# Patient Record
Sex: MALE | Race: AMERICAN INDIAN OR ALASKA NATIVE
[De-identification: names, ages, dates, MRNs, and addresses within clinical notes are randomized per-mention and may not be internally consistent; named-entity substitution may affect disease eponyms.]

---

## 2019-06-25 ENCOUNTER — HOSPITAL ENCOUNTER (INPATIENT)
Dept: HOSPITAL 5 - ED | Age: 54
LOS: 4 days | Discharge: HOME | DRG: 292 | End: 2019-06-29
Attending: INTERNAL MEDICINE | Admitting: INTERNAL MEDICINE
Payer: MEDICARE

## 2019-06-25 DIAGNOSIS — S27.809A: ICD-10-CM

## 2019-06-25 DIAGNOSIS — I11.0: Primary | ICD-10-CM

## 2019-06-25 DIAGNOSIS — Z87.81: ICD-10-CM

## 2019-06-25 DIAGNOSIS — K44.9: ICD-10-CM

## 2019-06-25 DIAGNOSIS — I50.9: ICD-10-CM

## 2019-06-25 DIAGNOSIS — R07.81: ICD-10-CM

## 2019-06-25 DIAGNOSIS — E66.2: ICD-10-CM
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
## 2019-06-25 DIAGNOSIS — I42.8: ICD-10-CM

## 2019-06-25 DIAGNOSIS — T38.0X5A: ICD-10-CM

## 2019-06-25 DIAGNOSIS — M87.151: ICD-10-CM

## 2019-06-25 DIAGNOSIS — Z79.01: ICD-10-CM

## 2019-06-25 DIAGNOSIS — Z95.810: ICD-10-CM

## 2019-06-25 DIAGNOSIS — I48.0: ICD-10-CM

## 2019-06-25 DIAGNOSIS — F17.200: ICD-10-CM

## 2019-06-25 DIAGNOSIS — J44.1: ICD-10-CM

## 2019-06-25 DIAGNOSIS — Z82.49: ICD-10-CM

## 2019-06-25 DIAGNOSIS — Z83.3: ICD-10-CM

## 2019-06-25 DIAGNOSIS — R07.89: ICD-10-CM

## 2019-06-25 LAB
ALBUMIN SERPL-MCNC: 3.8 G/DL (ref 3.9–5)
ALT SERPL-CCNC: 20 UNITS/L (ref 7–56)
BAND NEUTROPHILS # (MANUAL): 0 K/MM3
BUN SERPL-MCNC: 11 MG/DL (ref 9–20)
BUN/CREAT SERPL: 9 %
CALCIUM SERPL-MCNC: 9.6 MG/DL (ref 8.4–10.2)
HCT VFR BLD CALC: 43.2 % (ref 35.5–45.6)
HEMOLYSIS INDEX: 25
HGB BLD-MCNC: 14.2 GM/DL (ref 11.8–15.2)
MCHC RBC AUTO-ENTMCNC: 33 % (ref 32–34)
MCV RBC AUTO: 84 FL (ref 84–94)
MYELOCYTES # (MANUAL): 0 K/MM3
PLATELET # BLD: 205 K/MM3 (ref 140–440)
PROMYELOCYTES # (MANUAL): 0 K/MM3
RBC # BLD AUTO: 5.16 M/MM3 (ref 3.65–5.03)
T4 FREE SERPL-MCNC: 1.63 NG/DL (ref 0.76–1.46)
TOTAL CELLS COUNTED BLD: 100

## 2019-06-25 PROCEDURE — 83735 ASSAY OF MAGNESIUM: CPT

## 2019-06-25 PROCEDURE — 36415 COLL VENOUS BLD VENIPUNCTURE: CPT

## 2019-06-25 PROCEDURE — 81001 URINALYSIS AUTO W/SCOPE: CPT

## 2019-06-25 PROCEDURE — 36600 WITHDRAWAL OF ARTERIAL BLOOD: CPT

## 2019-06-25 PROCEDURE — 71260 CT THORAX DX C+: CPT

## 2019-06-25 PROCEDURE — 82803 BLOOD GASES ANY COMBINATION: CPT

## 2019-06-25 PROCEDURE — 71046 X-RAY EXAM CHEST 2 VIEWS: CPT

## 2019-06-25 PROCEDURE — 84484 ASSAY OF TROPONIN QUANT: CPT

## 2019-06-25 PROCEDURE — 85007 BL SMEAR W/DIFF WBC COUNT: CPT

## 2019-06-25 PROCEDURE — 86334 IMMUNOFIX E-PHORESIS SERUM: CPT

## 2019-06-25 PROCEDURE — 84165 PROTEIN E-PHORESIS SERUM: CPT

## 2019-06-25 PROCEDURE — 83880 ASSAY OF NATRIURETIC PEPTIDE: CPT

## 2019-06-25 PROCEDURE — 84439 ASSAY OF FREE THYROXINE: CPT

## 2019-06-25 PROCEDURE — 80053 COMPREHEN METABOLIC PANEL: CPT

## 2019-06-25 PROCEDURE — 84166 PROTEIN E-PHORESIS/URINE/CSF: CPT

## 2019-06-25 PROCEDURE — 93010 ELECTROCARDIOGRAM REPORT: CPT

## 2019-06-25 PROCEDURE — 94640 AIRWAY INHALATION TREATMENT: CPT

## 2019-06-25 PROCEDURE — 82962 GLUCOSE BLOOD TEST: CPT

## 2019-06-25 PROCEDURE — 74177 CT ABD & PELVIS W/CONTRAST: CPT

## 2019-06-25 PROCEDURE — 93306 TTE W/DOPPLER COMPLETE: CPT

## 2019-06-25 PROCEDURE — 85025 COMPLETE CBC W/AUTO DIFF WBC: CPT

## 2019-06-25 PROCEDURE — 82232 ASSAY OF BETA-2 PROTEIN: CPT

## 2019-06-25 PROCEDURE — 84443 ASSAY THYROID STIM HORMONE: CPT

## 2019-06-25 PROCEDURE — 93005 ELECTROCARDIOGRAM TRACING: CPT

## 2019-06-25 RX ADMIN — OXYCODONE PRN MG: 5 TABLET ORAL at 20:56

## 2019-06-25 RX ADMIN — Medication SCH ML: at 21:03

## 2019-06-25 RX ADMIN — FAMOTIDINE SCH MG: 20 TABLET ORAL at 21:03

## 2019-06-25 NOTE — XRAY REPORT
ROUTINE CHEST, TWO VIEWS:



HISTORY:  chest pain.



No comparison. A single lead pacemaker device terminates in the right 

ventricle. Heart size and pulmonary vessels are within normal limits. A 

small left pleural effusion is identified. Otherwise, the lungs are 

clear.



IMPRESSION:

Small left pleural effusion.

## 2019-06-25 NOTE — HISTORY AND PHYSICAL REPORT
History of Present Illness


Chief complaint: 





I keep coughing and it makes my side hurt, and im short of breath


History of present illness: 





53 YO Male with COPD, CHF, Obesity Hypoventilation presents to ED for evaluation

. Pt states that he has experienced pain to his left flank over the past 3 days 

with worsening symptoms over the past 2 days. Pt reports coughing episodes 

followed by pain to the left flank. Pt also reports diaphragmatic injury 3 

months ago and that the was informed that he may need surgery. Pt acknowledges 

shortness of breath, and nonproductive cough, increased nebulizer therapy 

without relief. Pt also reports dypsnea on exertion, dypsnea at rest, decreased 

exercise tolerance, Orthopnea/PND, as well as leg edema. Pt denies fever, 

chills, CP, Palpitations, NVD, Trauma, BRBPR, Prolonged travel/immobility, 

Unilateral leg swelling, calf pain, or recent ill contacts. Pt denies compliance

with cardiac diet as well as medication. Pt unable to recall his prescribed 

medication. Pt transported to [facility] via private vehicle. Pt seen and evaluated in 

ED and found to have symptoms consistent with CHF Decompensation, as well as 

COPD Exacerbation. Pt admitted to telemetry. CT Chest pending at time of a

dmission. No prior admission for review. No medication listed for reconciliation

at time of admission. 





Past History


Past Medical History: COPD, heart failure, other (Obesity Hypoventilation, 

Diaphragmatic injury,)


Past Surgical History: Other (Embelectomy)


Social history: single.  denies: smoking, alcohol abuse, prescription drug abuse


Family history: diabetes, hypertension





Medications and Allergies


                                    Allergies











Allergy/AdvReac Type Severity Reaction Status Date / Time


 


doxycycline Allergy  Hives Verified 06/25/19 12:40


 


lisinopril Allergy  Anaphylaxis Verified 06/25/19 12:39


 


spironolactone Allergy  Hives Verified 06/25/19 12:40














Review of Systems


Constitutional: no weight loss, no weight gain, no fever


Ears, nose, mouth and throat: no ear pain, no ear discharge, no tinnitis, no 

decreased hearing


Cardiovascular: orthopnea, edema, shortness of breath, dyspnea on exertion, 

paroxysmal nocturnal dyspnea, decreased exercise tolerance, no chest pain, no 

syncope


Respiratory: cough, shortness of breath, dyspnea on exertion, no cough with 

sputum, no excessive sputum, no wheezing


Gastrointestinal: no nausea, no vomiting, no diarrhea, no constipation


Genitourinary Male: no hematuria, no flank pain, no discharge, no urinary 

frequency, no urinary hesitancy


Rectal: no pain, no incontinence, no bleeding


Musculoskeletal: no neck stiffness


Integumentary: no rash, no pruritis, no redness, no sores


Neurological: no transient paralysis, no paralysis, no weakness, no parathesias,

no numbness


Psychiatric: no anxiety, no memory loss, no change in sleep habits, no sleep 

disturbances


Endocrine: no cold intolerance, no heat intolerance, no polyphagia, no excessive

thirst, no polydipsia, no polyuria


Hematologic/Lymphatic: no easy bruising, no easy bleeding


Allergic/Immunologic: no urticaria, no allergic rhinitis, no wheezing





Exam





- Constitutional


General appearance: Present: mild distress





- EENT


Eyes: Present: PERRL


ENT: hearing intact, clear oral mucosa





- Neck


Neck: Present: supple, normal ROM





- Respiratory


Respiratory effort: normal


Respiratory: bilateral: diminished, rhonchi





- Cardiovascular


Heart Sounds: Present: S1 & S2.  Absent: rub, click





- Extremities


Extremities: pulses symmetrical


Extremity abnormal: edema


Peripheral Pulses: within normal limits





- Abdominal


General gastrointestinal: Present: soft, non-tender, non-distended, normal bowel

sounds


Male genitourinary: Present: normal





- Integumentary


Integumentary: Present: clear, warm, dry





- Musculoskeletal


Musculoskeletal: gait normal, strength equal bilaterally





- Psychiatric


Psychiatric: appropriate mood/affect, intact judgment & insight





- Neurologic


Neurologic: CNII-XII intact, moves all extremities





- Additional findings


Additional findings: 





Left chest wall tenderness with palpation in area of the left flank at site of 

previous rib fractures. 





Results





- Labs


CBC & Chem 7: 


                                 06/26/19 05:06





                                 06/26/19 05:06


Labs: 


                              Abnormal lab results











  06/25/19 06/25/19 Range/Units





  13:04 13:04 


 


RBC  5.16 H   (3.65-5.03)  M/mm3


 


RDW  17.0 H   (13.2-15.2)  %


 


Monocytes % (Manual)  12.0 H   (0.0-7.3)  %


 


Lymphocytes # (Manual)  1.0 L   (1.2-5.4)  K/mm3


 


Albumin   3.8 L  (3.9-5)  g/dL














Assessment and Plan





- Patient Problems


(1) CHF (congestive heart failure)


Current Visit: Yes   Status: Acute   


Qualifiers: 


   Heart failure chronicity: acute on chronic 


Plan to address problem: 


Admit to telemetry, cardiology consult, Echo, strict I/O, daily weight, bnp, 

thyroid panel, magnesium level, chest x ray, monitor uop q shift, monitor blood 

pressure q shift, afterload reduction. pulse oximetry








(2) Obesity hypoventilation syndrome


Current Visit: Yes   Status: Acute   


Plan to address problem: 


supplemental oxygen, nebulizer therapy, NIPPV as clinically indicated.








(3) Acute exacerbation of chronic obstructive pulmonary disease (COPD)


Current Visit: Yes   Status: Acute   


Plan to address problem: 


supplemental oxygen, nebulizer therapy, steroid therapy, 








(4) Diaphragm injury


Current Visit: Yes   Status: Chronic   


Qualifiers: 


   Encounter type: initial encounter   Qualified Code(s): S27.809A - Unspecified

injury of diaphragm, initial encounter   


Plan to address problem: 


CT abdomen pelvis, supportive care, 








(5) DVT prophylaxis


Current Visit: Yes   Status: Acute   


Plan to address problem: 


SCD to BLE while in bed, hold anticoagulation until clarification of nature of 

diaphragm injury.

## 2019-06-25 NOTE — CAT SCAN REPORT
PROCEDURE: CT CHEST W CON 

 

TECHNIQUE:  Computerized axial tomography of the chest was performed during the IV injection of iodin
ated nonionic contrast.  

 

CT DOSE LENGTH PRODUCT:  1226.3  mGycm 

 

HISTORY: left sided pleural effusion hx of diaphragm injusy 

 

COMPARISONS:  None . 

 

FINDINGS: 

 

Pulmonary out flow tract, right and left main pulmonary arteries:  No abnormality is seen. 

 

Pericardium: No evidence of pericardial effusion. 

 

 

Thoracic aorta:  No evidence of aneurysmal dilatation or dissection. 

 

 

Coronary arteries: There are unremarkable. 

 

Mediastinum and hilar regions: Non specific subcentimeter lymph nodes are visualized.  No pathologica
lly enlarged lymph nodes or masses are identified. 

 

Lung Fields: There is a large mass with fat density visualized in the left pleural space. This extend
s craniocaudally posteriorly approximately 12 cm, transversely approximately 10.5 cm in craniocaudal 
lateral aspect of the mass approximately 3.1 cm. Large lipoma or lipomatosis of the left pleural spac
e is suspected. Liposarcoma is not entirely excluded. There appears to be a small amount of adjacent 
atelectasis. Moderate diffuse emphysematous changes are present, greatest in the upper lobes. No effu
sions are identified. 

 

Upper abdomen:  There is mild increased density dependently in the gallbladder. This may represent a 
small amount of sludge. No formed stones are visualized. Consider follow-up gallbladder ultrasound. U
pper abdomen otherwise is unremarkable. 

 

Other: Old healed fractures of the left seventh and eighth ribs visualized. Fractures of the left bryanna
th and 10th ribs are also visualized with callus formation although incomplete bony union. Pacemaker 
implanted in the left side of the chest. Cardiac leads visualized right side of the heart. 

 

IMPRESSION: 

 

Large fat density visualized left pleural space inferiorly, laterally and posteriorly as described. T
his may represent a large lipoma or lipomatosis of the pleural space. Liposarcoma cannot entirely exc
luded. Comparison with prior CT scans would be helpful. 

 

Moderate emphysematous changes present diffusely bilaterally, greatest in the upper lobes. 

 

Pacemaker in place as described. 

 

Subtle increased density dependently in the gallbladder. There may be sludge or noncalcified gallston
es. Consider follow-up gallbladder ultrasound. 

 

Left rib fractures as described. 

 

This document is electronically signed by Adam Cedeno MD., June 25 2019 05:43:54 PM ET

## 2019-06-25 NOTE — EMERGENCY DEPARTMENT REPORT
ED Chest Pain HPI





- General


Chief Complaint: Abdominal Pain


Stated Complaint: ABD PAIN/CP


Time Seen by Provider: 19 12:43


Source: patient


Mode of arrival: Stretcher


Limitations: No Limitations





- History of Present Illness


Initial Comments: 





Patient is a 54-year-old  male who has a past medical history of several 

broken ribs that occurred in February of this year as well as a "ruptured 

diagram" in April who is complaining of left sided flank and chest pain.  

Patient states that pain has been worsening for the past 2-3 days.  States it is

a very painful cough that is nonproductive.  Patient has some shortness of 

breath as well.  Patient denies fevers chills nausea vomiting diarrhea at this 

time.  Patient denies any trauma.  Patient has a history of congestive heart 

failure and COPD and does take nebulized treatments regularly.


Severity scale (0 -10): 2





- Related Data


                                    Allergies











Allergy/AdvReac Type Severity Reaction Status Date / Time


 


doxycycline Allergy  Hives Verified 19 12:40


 


lisinopril Allergy  Anaphylaxis Verified 19 12:39


 


spironolactone Allergy  Hives Verified 19 12:40














Heart Score





- HEART Score


History: Slightly suspicious


EKG: Non-specific


Age: 45-65


Risk factors: 1-2 risk factors


Troponin: < normal limit


HEART Score: 3





ED Review of Systems


ROS: 


Stated complaint: ABD PAIN/CP


Other details as noted in HPI





Comment: All other systems reviewed and negative





ED Past Medical Hx





- Past Medical History


Previous Medical History?: Yes


Hx Congestive Heart Failure: Yes


Hx COPD: Yes





- Surgical History


Past Surgical History?: Yes


Additional Surgical History: Embolectomy





ED Physical Exam





- General


Limitations: No Limitations


General appearance: alert, in no apparent distress





- Head


Head exam: Present: atraumatic, normocephalic





- Eye


Eye exam: Present: normal appearance, PERRL, EOMI





- ENT


ENT exam: Present: mucous membranes moist





- Neck


Neck exam: Present: normal inspection





- Respiratory


Respiratory exam: Present: normal lung sounds bilaterally, rhonchi (lest base), 

chest wall tenderness (left lower ribs with crepitus when palpation of ribs).  

Absent: respiratory distress, wheezes, rales





- Cardiovascular


Cardiovascular Exam: Present: regular rate, normal rhythm, normal heart sounds. 

Absent: systolic murmur, diastolic murmur, rubs, gallop





- GI/Abdominal


GI/Abdominal exam: Present: soft, normal bowel sounds.  Absent: distended, 

tenderness, guarding, rebound





- Rectal


Rectal exam: Present: deferred





- Extremities Exam


Extremities exam: Present: normal inspection





- Back Exam


Back exam: Present: normal inspection





- Neurological Exam


Neurological exam: Present: alert, oriented X3





- Psychiatric


Psychiatric exam: Present: normal affect, normal mood





- Skin


Skin exam: Present: warm, dry, intact, normal color.  Absent: rash





ED Medical Decision Making





- Lab Data


Result diagrams: 


                                 19 13:04





                                 19 13:04








                                   Lab Results











  19 Range/Units





  13:04 13:04 


 


WBC  4.9   (4.5-11.0)  K/mm3


 


RBC  5.16 H   (3.65-5.03)  M/mm3


 


Hgb  14.2   (11.8-15.2)  gm/dl


 


Hct  43.2   (35.5-45.6)  %


 


MCV  84   (84-94)  fl


 


MCH  28   (28-32)  pg


 


MCHC  33   (32-34)  %


 


RDW  17.0 H   (13.2-15.2)  %


 


Plt Count  205   (140-440)  K/mm3


 


Mono % (Auto)  Np   


 


Add Manual Diff  Complete   


 


Total Counted  100   


 


Seg Neuts % (Manual)  66.0   (40.0-70.0)  %


 


Band Neutrophils %  0   %


 


Lymphocytes % (Manual)  21.0   (13.4-35.0)  %


 


Reactive Lymphs % (Man)  0   %


 


Monocytes % (Manual)  12.0 H   (0.0-7.3)  %


 


Eosinophils % (Manual)  1.0   (0.0-4.3)  %


 


Basophils % (Manual)  0   (0.0-1.8)  %


 


Metamyelocytes %  0   %


 


Myelocytes %  0   %


 


Promyelocytes %  0   %


 


Blast Cells %  0   %


 


Nucleated RBC %  Not Reportable   


 


Seg Neutrophils # Man  3.2   (1.8-7.7)  K/mm3


 


Band Neutrophils #  0.0   K/mm3


 


Lymphocytes # (Manual)  1.0 L   (1.2-5.4)  K/mm3


 


Abs React Lymphs (Man)  0.0   K/mm3


 


Monocytes # (Manual)  0.6   (0.0-0.8)  K/mm3


 


Eosinophils # (Manual)  0.0   (0.0-0.4)  K/mm3


 


Basophils # (Manual)  0.0   (0.0-0.1)  K/mm3


 


Metamyelocytes #  0.0   K/mm3


 


Myelocytes #  0.0   K/mm3


 


Promyelocytes #  0.0   K/mm3


 


Blast Cells #  0.0   K/mm3


 


WBC Morphology  Not Reportable   


 


Hypersegmented Neuts  Not Reportable   


 


Hyposegmented Neuts  Not Reportable   


 


Hypogranular Neuts  Not Reportable   


 


Smudge Cells  Not Reportable   


 


Toxic Granulation  Not Reportable   


 


Toxic Vacuolation  Not Reportable   


 


Dohle Bodies  Not Reportable   


 


Pelger-Huet Anomaly  Not Reportable   


 


Homar Rods  Not Reportable   


 


Platelet Estimate  Consistent w auto   


 


Clumped Platelets  Not Reportable   


 


Plt Clumps, EDTA  Not Reportable   


 


Large Platelets  Not Reportable   


 


Giant Platelets  Not Reportable   


 


Platelet Satelliting  Not Reportable   


 


Plt Morphology Comment  Not Reportable   


 


RBC Morphology  Not Reportable   


 


Dimorphic RBCs  Not Reportable   


 


Polychromasia  Not Reportable   


 


Hypochromasia  Not Reportable   


 


Poikilocytosis  Not Reportable   


 


Anisocytosis  Not Reportable   


 


Microcytosis  Not Reportable   


 


Macrocytosis  Not Reportable   


 


Spherocytes  Not Reportable   


 


Pappenheimer Bodies  Not Reportable   


 


Sickle Cells  Not Reportable   


 


Target Cells  Not Reportable   


 


Tear Drop Cells  Not Reportable   


 


Ovalocytes  Not Reportable   


 


Helmet Cells  Not Reportable   


 


Pathak-[place] Bodies  Not Reportable   


 


Cabot Rings  Not Reportable   


 


Prosper Cells  Not Reportable   


 


Bite Cells  Not Reportable   


 


Crenated Cell  Not Reportable   


 


Elliptocytes  Few   


 


Acanthocytes (Spur)  Not Reportable   


 


Rouleaux  Not Reportable   


 


Hemoglobin C Crystals  Not Reportable   


 


Schistocytes  Not Reportable   


 


Malaria parasites  Not Reportable   


 


Alireza Bodies  Not Reportable   


 


Hem Pathologist Commnt  No   


 


Sodium   143  (137-145)  mmol/L


 


Potassium   4.7  (3.6-5.0)  mmol/L


 


Chloride   106.6  ()  mmol/L


 


Carbon Dioxide   23  (22-30)  mmol/L


 


Anion Gap   18  mmol/L


 


BUN   11  (9-20)  mg/dL


 


Creatinine   1.2  (0.8-1.5)  mg/dL


 


Estimated GFR   > 60  ml/min


 


BUN/Creatinine Ratio   9  %


 


Glucose   85  ()  mg/dL


 


Calcium   9.6  (8.4-10.2)  mg/dL


 


Total Bilirubin   0.40  (0.1-1.2)  mg/dL


 


AST   37  (5-40)  units/L


 


ALT   20  (7-56)  units/L


 


Alkaline Phosphatase   117  ()  units/L


 


Troponin T   < 0.010  (0.00-0.029)  ng/mL


 


Total Protein   7.0  (6.3-8.2)  g/dL


 


Albumin   3.8 L  (3.9-5)  g/dL


 


Albumin/Globulin Ratio   1.2  %














- EKG Data


-: EKG Interpreted by Me





- EKG Data





19 17:15


EKG shows sinus rhythm a rate of 75 axis normal interval show prolonged QT but 

no ST segment elevations or depressions.  Time of interpretation is 1255





- Radiology Data





[facility] 


[address] [place] [address] 





XRay Report 


Signed 





Patient: BRIGHT,JEFFREY DUANE MR#: [id number] 


[id number] 


: 1965 Acct:[id number] 





Age/Sex: 54 / M ADM Date: 19 





Loc: ED 


Attending Dr: 








Ordering Physician: RAVINDER HINTON 


Date of Service: 19 


Procedure(s): XR chest routine 2V 


Accession Number(s): [id number] 





cc: RAVINDER HINTON 





Fluoro Time In Minutes: 





ROUTINE CHEST, TWO VIEWS: 





HISTORY: chest pain. 





No comparison. A single lead pacemaker device terminates in the right 


ventricle. Heart size and pulmonary vessels are within normal limits. A 


small left pleural effusion is identified. Otherwise, the lungs are 


clear. 





IMPRESSION: 


Small left pleural effusion. 





Transcribed By: TTR 


Dictated By: OLIVIA ZHOU JR, MD 


Electronically Authenticated By: OLIVIA ZHOU JR, MD 


Signed Date/Time: 19 133 











DD/DT: 19 1329 


TD/TT: 19 1330





- Medical Decision Making





Patient is a 54-year-old American male with a past medical history of recent rib

fractures which are poorly healed as well as diaphragm rupture according to the 

patient is complaining of left upper flank pain.  Patient's x-ray shows that the

patient has a mild-to-moderate sized pleural effusion.  Patient never had a 

pleural effusion in the past.  Patient while in the emergency department and 

started having some wheezing and shortness of breath was given a neb treatment. 

Patient was given pain meds.  Patient to be admitted to the hospitalist service 

at this time.


Critical care attestation.: 


If time is entered above; I have spent that time in minutes in the direct care 

of this critically ill patient, excluding procedure time.








ED Disposition


Clinical Impression: 


 Pleural effusion, Acute exacerbation of chronic obstructive pulmonary disease 

(COPD), Acute chest pain





Disposition:  OP ADMIT IP TO THIS HOSP


Is pt being admited?: Yes


Condition: Stable


Instructions:  Chronic Obstructive Pulmonary Disease (ED), Chest Pain (ED)


Time of Disposition: 17:27

## 2019-06-25 NOTE — EVENT NOTE
ED Screening Note


ED Screening Note: 





severe luq and chest pain


recent rib trauma and rupt diaph.


pmh


chf


aicd


htn


obese





cig none


etoh none


drugs none





does not recall home meds





new to the area





no fever


no n/v/d





This initial assessment/diagnostic orders/clinical plan/treatment(s) is/are 

subject to change based on patients health status, clinical progression and re-

assessment by fellow clinical providers in the ED. Further treatment and workup 

at subsequent clinical providers discretion. Patient/guardian urged not to elope

from the ED as their condition may be serious if not clinically assessed and 

managed. 





Initial orders include:

## 2019-06-26 LAB
ALBUMIN SERPL-MCNC: 3.4 G/DL (ref 3.9–5)
ALT SERPL-CCNC: 18 UNITS/L (ref 7–56)
BAND NEUTROPHILS # (MANUAL): 0 K/MM3
BILIRUB UR QL STRIP: (no result)
BLOOD UR QL VISUAL: (no result)
BUN SERPL-MCNC: 14 MG/DL (ref 9–20)
BUN/CREAT SERPL: 11 %
CALCIUM SERPL-MCNC: 8.7 MG/DL (ref 8.4–10.2)
HCT VFR BLD CALC: 38.6 % (ref 35.5–45.6)
HEMOLYSIS INDEX: 15
HGB BLD-MCNC: 12.7 GM/DL (ref 11.8–15.2)
MCHC RBC AUTO-ENTMCNC: 33 % (ref 32–34)
MCV RBC AUTO: 83 FL (ref 84–94)
MUCOUS THREADS #/AREA URNS HPF: (no result) /HPF
MYELOCYTES # (MANUAL): 0 K/MM3
PH UR STRIP: 5 [PH] (ref 5–7)
PLATELET # BLD: 183 K/MM3 (ref 140–440)
PROMYELOCYTES # (MANUAL): 0 K/MM3
RBC # BLD AUTO: 4.65 M/MM3 (ref 3.65–5.03)
RBC #/AREA URNS HPF: 3 /HPF (ref 0–6)
TOTAL CELLS COUNTED BLD: 100
UROBILINOGEN UR-MCNC: 2 MG/DL (ref ?–2)
WBC #/AREA URNS HPF: 2 /HPF (ref 0–6)

## 2019-06-26 RX ADMIN — FAMOTIDINE SCH MG: 20 TABLET ORAL at 21:02

## 2019-06-26 RX ADMIN — OXYCODONE PRN MG: 5 TABLET ORAL at 06:23

## 2019-06-26 RX ADMIN — OXYCODONE PRN MG: 5 TABLET ORAL at 14:55

## 2019-06-26 RX ADMIN — Medication SCH ML: at 21:04

## 2019-06-26 RX ADMIN — OXYCODONE PRN MG: 5 TABLET ORAL at 01:26

## 2019-06-26 RX ADMIN — OXYCODONE PRN MG: 5 TABLET ORAL at 21:02

## 2019-06-26 RX ADMIN — FAMOTIDINE SCH MG: 20 TABLET ORAL at 10:35

## 2019-06-26 RX ADMIN — Medication SCH ML: at 10:35

## 2019-06-26 RX ADMIN — OXYCODONE PRN MG: 5 TABLET ORAL at 10:35

## 2019-06-26 NOTE — CAT SCAN REPORT
PROCEDURE: CT ABDOMEN PELVIS W CON 

 

TECHNIQUE:  Computerized axial tomography of the abdomen and pelvis was performed after the administr
ation of IV iodinated nonionic contrast.   

 

CT DOSE LENGTH PRODUCT:  3808  mGycm 

 

HISTORY: diaphragmatic injury 

 

COMPARISONS:  None . 

 

FINDINGS: 

 

Lower Lung fields:  There is a large amount of fat density visualized in the left lung base extending
 superiorly/posteriorly. Examination of the upper abdomen shows a diaphragmatic defect inferiorly and
 laterally/anteriorly on the left. Mesentery or omentum is seen extending superiorly into the left pl
eural space. This is best visualized on sagittal reconstruction image 333 series 300 and several medardo
cent images. This is also visualized on axial image 25 series 4 and coronal reconstruction image [id number]
[id number] series 301. There are fractures of the left eighth and ninth ribs posterior laterally with incompl
ete bony union. Old healed fractures of the left sixth and seventh ribs laterally are also visualized
. Small amount of dependent atelectasis appears be present on the left. There is minimal fibrotic lelo
nge in the inferior medial aspect of the right lower lobe. Cardiac leads visualized in the right side
 of the heart. 

 

Upper Abdomen:  The liver, the gallbladder, the adrenal glands, the pancreas and spleen are unremarka
ble. 

 

Kidneys, Ureters and Urinary bladder:  No abnormalities are seen. 

 

Retroperitoneum: Atherosclerotic changes are seen in the abdominal aorta.  No aneurysm is visualized.
 

 

Nonspecific subcentimeter lymph nodes are seen in the retroperitoneum.  No pathologically enlarged ly
mph nodes are identified. 

 

Bowel:  No abnormalities are identified. No evidence of bowel obstruction, ascites or free intraperit
prince gas. Normal-appearing appendix is visualized in the right lower quadrant. Minimal umbilical her
jj containing adipose tissue visualized. 

 

Reproductive organs:  The prostate gland does not appear to be significantly enlarged. 

 

Other: There is a geographic lesion with sclerotic margins involving the right femoral head along the
 weightbearing surface extending over approximately 1.8 cm. The appearance suggests avascular necrosi
s. There is no deformity or collapse. 

 

IMPRESSION: 

 

Old left rib fractures visualized as described above. Please see above comments. There is incomplete 
bony union of the fractures involving the left eighth and ninth ribs. 

 

Focal defect visualized in the anterior lateral inferior aspect of the left diaphragm. Large amount o
f adipose tissue herniated from the abdomen into the left pleural space as described. A small amount 
of atelectasis appears be present in the lung bases. 

 

Geographic lesion with sclerotic margins seen involving the right femoral head consistent with avascu
lar necrosis. There is no deformity or collapse. 

 

Minimal umbilical hernia as described. 

 

This document is electronically signed by Adam Cedeno MD., June 26 2019 04:27:38 PM ET

## 2019-06-26 NOTE — PROGRESS NOTE
Assessment and Plan


Assessment and plan: 


55 YO Male with COPD, CHF, Obesity Hypoventilation presents to ED for 

evaluation. Pt states that he has experienced pain to his left flank over the 

past 3 days with worsening symptoms over the past 2 days. Pt reports coughing 

episodes followed by pain to the left flank. Pt also reports diaphragmatic inju

ry 3 months ago and that the was informed that he may need surgery. Pt 

acknowledges shortness of breath, and nonproductive cough, increased nebulizer 

therapy without relief. Pt also reports dypsnea on exertion, dypsnea at rest, 

decreased exercise tolerance, Orthopnea/PND, as well as leg edema. Pt denies 

fever, chills, CP, Palpitations, NVD, Trauma, BRBPR, Prolonged 

travel/immobility, Unilateral leg swelling, calf pain, or recent ill contacts. 

Pt denies compliance with cardiac diet as well as medication. Pt unable to 

recall his prescribed medication. Pt transported to [facility] via private vehicle. Pt 

seen and evaluated in ED and found to have symptoms consistent with CHF 

Decompensation, as well as COPD Exacerbation. Pt admitted to telemetry. CT Chest

pending at time of admission. No prior admission for review. No medication 

listed for reconciliation at time of admission. 





- Patient Problems


(1) CHF (congestive heart failure)


Current Visit: Yes   Status: Acute   


Qualifiers: 


   Heart failure chronicity: acute on chronic 


Plan to address problem: 


Continue current management, await cardiology consult, Echo, strict I/O, daily 

weight, bnp, thyroid panel, magnesium level, chest x ray, monitor uop q shift, 

monitor blood pressure q shift, afterload reduction. pulse oximetry








(2) Obesity hypoventilation syndrome


Current Visit: Yes   Status: Acute   


Plan to address problem: 


supplemental oxygen, nebulizer therapy, NIPPV as clinically indicated.








(3) Acute exacerbation of chronic obstructive pulmonary disease (COPD)


Current Visit: Yes   Status: Acute   


Plan to address problem: 


supplemental oxygen, nebulizer therapy, steroid therapy, 


pULMONARY CONSULT





(4) Diaphragm injury


Current Visit: Yes   Status: Chronic   


Qualifiers: 


   Encounter type: initial encounter   Qualified Code(s): S27.809A - Unspecified

injury of diaphragm, initial encounter   


Plan to address problem: 


CT abdomen pelvis, supportive care, 








(5) hx of RIB fracture


Continue current supportive care treatment








(6)DVT prophylaxis


Current Visit: Yes   Status: Acute   


Plan to address problem: 


SCD to BLE while in bed, hold anticoagulation until clarification of nature of 

diaphragm injury.











History


Interval history: 


Patient seen and examined this morning, improving some. Unfortunately reports 

multiple medical condition and has a hx of violent cough which was felt to have 

contributed in the rib fractures prior. He recently moved to [place] and has not

established care








Hospitalist Physical





- Physical exam


Narrative exam: 


- EENT


Eyes: Present: PERRL


ENT: hearing intact, clear oral mucosa





- Neck


Neck: Present: supple, normal ROM





- Respiratory


Respiratory effort: normal


Respiratory: bilateral: diminished, rhonchi





- Cardiovascular


Heart Sounds: Present: S1 & S2.  Absent: rub, click





- Extremities


Extremities: pulses symmetrical


Extremity abnormal: edema


Peripheral Pulses: within normal limits





- Abdominal


General gastrointestinal: Present: soft, non-tender, non-distended, normal bowel

sounds


Male genitourinary: Present: normal





- Integumentary


Integumentary: Present: clear, warm, dry





- Musculoskeletal


Musculoskeletal: gait normal, strength equal bilaterally





- Psychiatric


Psychiatric: appropriate mood/affect, intact judgment & insight





- Neurologic


Neurologic: CNII-XII intact, moves all extremities





- Additional findings


Additional findings: 





Left chest wall tenderness with palpation in area of the left flank at site of 

previous rib fractures. 








- Constitutional


Vitals: 


                                        











Temp Pulse Resp BP Pulse Ox


 


 97.5 F L  79   18   129/90   95 


 


 06/26/19 09:00  06/26/19 03:44  06/26/19 09:00  06/26/19 09:00  06/26/19 12:04











General appearance: Present: no acute distress





Results





- Labs


CBC & Chem 7: 


                                 06/26/19 05:06





                                 06/26/19 05:06


Labs: 


                             Laboratory Last Values











WBC  3.9 K/mm3 (4.5-11.0)  L  06/26/19  05:06    


 


RBC  4.65 M/mm3 (3.65-5.03)   06/26/19  05:06    


 


Hgb  12.7 gm/dl (11.8-15.2)   06/26/19  05:06    


 


Hct  38.6 % (35.5-45.6)   06/26/19  05:06    


 


MCV  83 fl (84-94)  L  06/26/19  05:06    


 


MCH  27 pg (28-32)  L  06/26/19  05:06    


 


MCHC  33 % (32-34)   06/26/19  05:06    


 


RDW  16.3 % (13.2-15.2)  H  06/26/19  05:06    


 


Plt Count  183 K/mm3 (140-440)   06/26/19  05:06    


 


Mono % (Auto)  Np   06/26/19  05:06    


 


Add Manual Diff  Complete   06/26/19  05:06    


 


Total Counted  100   06/26/19  05:06    


 


Seg Neuts % (Manual)  52.0 % (40.0-70.0)   06/26/19  05:06    


 


  0 %  06/26/19  05:06    


 


  23.0 % (13.4-35.0)   06/26/19  05:06    


 


Reactive Lymphs % (Man)  0 %  06/26/19  05:06    


 


  20.0 % (0.0-7.3)  H  06/26/19  05:06    


 


  4.0 % (0.0-4.3)   06/26/19  05:06    


 


  1.0 % (0.0-1.8)   06/26/19  05:06    


 


  0 %  06/26/19  05:06    


 


  0 %  06/26/19  05:06    


 


  0 %  06/26/19  05:06    


 


  0 %  06/26/19  05:06    


 


Nucleated RBC %  Not Reportable   06/26/19  05:06    


 


Seg Neutrophils # Man  2.0 K/mm3 (1.8-7.7)   06/26/19  05:06    


 


Band Neutrophils #  0.0 K/mm3  06/26/19  05:06    


 


  0.9 K/mm3 (1.2-5.4)  L  06/26/19  05:06    


 


Abs React Lymphs (Man)  0.0 K/mm3  06/26/19  05:06    


 


  0.8 K/mm3 (0.0-0.8)   06/26/19  05:06    


 


  0.2 K/mm3 (0.0-0.4)   06/26/19  05:06    


 


  0.0 K/mm3 (0.0-0.1)   06/26/19  05:06    


 


  0.0 K/mm3  06/26/19  05:06    


 


  0.0 K/mm3  06/26/19  05:06    


 


  0.0 K/mm3  06/26/19  05:06    


 


Blast Cells #  0.0 K/mm3  06/26/19  05:06    


 


WBC Morphology  Not Reportable   06/26/19  05:06    


 


Hypersegmented Neuts  Not Reportable   06/26/19  05:06    


 


Hyposegmented Neuts  Not Reportable   06/26/19  05:06    


 


Hypogranular Neuts  Not Reportable   06/26/19  05:06    


 


  Not Reportable   06/26/19  05:06    


 


  Not Reportable   06/26/19  05:06    


 


  Not Reportable   06/26/19  05:06    


 


  Not Reportable   06/26/19  05:06    


 


  Not Reportable   06/26/19  05:06    


 


  Not Reportable   06/26/19  05:06    


 


  Not Reportable   06/26/19  05:06    


 


  Not Reportable   06/26/19  05:06    


 


Plt Clumps, EDTA  Not Reportable   06/26/19  05:06    


 


  Not Reportable   06/26/19  05:06    


 


  Not Reportable   06/26/19  05:06    


 


  Not Reportable   06/26/19  05:06    


 


Plt Morphology Comment  Not Reportable   06/26/19  05:06    


 


RBC Morphology  Normal   06/26/19  05:06    


 


Dimorphic RBCs  Not Reportable   06/26/19  05:06    


 


  Not Reportable   06/26/19  05:06    


 


  Not Reportable   06/26/19  05:06    


 


  Not Reportable   06/26/19  05:06    


 


  Not Reportable   06/26/19  05:06    


 


  Not Reportable   06/26/19  05:06    


 


  Not Reportable   06/26/19  05:06    


 


  Not Reportable   06/26/19  05:06    


 


  Not Reportable   06/26/19  05:06    


 


  Not Reportable   06/26/19  05:06    


 


  Not Reportable   06/26/19  05:06    


 


  Not Reportable   06/26/19  05:06    


 


  Not Reportable   06/26/19  05:06    


 


  Not Reportable   06/26/19  05:06    


 


  Not Reportable   06/26/19  05:06    


 


  Not Reportable   06/26/19  05:06    


 


  Not Reportable   06/26/19  05:06    


 


  Not Reportable   06/26/19  05:06    


 


  Not Reportable   06/26/19  05:06    


 


  Not Reportable   06/26/19  05:06    


 


Acanthocytes (Spur)  Not Reportable   06/26/19  05:06    


 


Rouleaux  Not Reportable   06/26/19  05:06    


 


  Not Reportable   06/26/19  05:06    


 


  Not Reportable   06/26/19  05:06    


 


  Not Reportable   06/26/19  05:06    


 


  Not Reportable   06/26/19  05:06    


 


Hem Pathologist Commnt  No   06/26/19  05:06    


 


Sodium  142 mmol/L (137-145)   06/26/19  05:06    


 


Potassium  4.4 mmol/L (3.6-5.0)   06/26/19  05:06    


 


Chloride  105.6 mmol/L ()   06/26/19  05:06    


 


Carbon Dioxide  23 mmol/L (22-30)   06/26/19  05:06    


 


  18 mmol/L  06/26/19  05:06    


 


BUN  14 mg/dL (9-20)   06/26/19  05:06    


 


  1.3 mg/dL (0.8-1.5)   06/26/19  05:06    


 


Estimated GFR  > 60 ml/min  06/26/19  05:06    


 


  11 %  06/26/19  05:06    


 


Glucose  88 mg/dL ()   06/26/19  05:06    


 


Calcium  8.7 mg/dL (8.4-10.2)   06/26/19  05:06    


 


Magnesium  2.30 mg/dL (1.7-2.3)   06/25/19  19:43    


 


  0.30 mg/dL (0.1-1.2)   06/26/19  05:06    


 


AST  32 units/L (5-40)   06/26/19  05:06    


 


ALT  18 units/L (7-56)   06/26/19  05:06    


 


  106 units/L ()   06/26/19  05:06    


 


  < 0.010 ng/mL (0.00-0.029)   06/25/19  13:04    


 


NT-Pro-B Natriuret Pep  913.6 pg/mL (0-900)  H  06/25/19  19:43    


 


  6.0 g/dL (6.3-8.2)  L  06/26/19  05:06    


 


  3.4 g/dL (3.9-5)  L  06/26/19  05:06    


 


  1.3 %  06/26/19  05:06    


 


TSH  0.950 mlU/mL (0.270-4.200)   06/25/19  19:43    


 


Free T4  1.63 ng/dL (0.76-1.46)  H  06/25/19  19:43    


 


  [place]  (Yellow)   06/25/19  01:30    


 


  Slightly-cloudy  (Clear)   06/25/19  01:30    


 


  5.0  (5.0-7.0)   06/25/19  01:30    


 


Ur Specific Gravity  > 1.030  (1.003-1.030)  H  06/25/19  01:30    


 


  30 mg/dl mg/dL (Negative)   06/25/19  01:30    


 


  Neg mg/dL (Negative)   06/25/19  01:30    


 


  Neg mg/dL (Negative)   06/25/19  01:30    


 


  Sm  (Negative)   06/25/19  01:30    


 


  Neg  (Negative)   06/25/19  01:30    


 


  Neg  (Negative)   06/25/19  01:30    


 


  2.0 mg/dL (<2.0)   06/25/19  01:30    


 


Ur Leukocyte Esterase  Neg  (Negative)   06/25/19  01:30    


 


  2.0 /HPF (0.0-6.0)   06/25/19  01:30    


 


  3.0 /HPF (0.0-6.0)   06/25/19  01:30    


 


U Epithel Cells (Auto)  1.0 /HPF (0-13.0)   06/25/19  01:30    


 


  3+ /HPF  06/25/19  01:30    














Active Medications





- Current Medications


Current Medications: 














Generic Name Dose Route Start Last Admin





  Trade Name Freq  PRN Reason Stop Dose Admin


 


Acetaminophen  650 mg  06/25/19 17:35 





  Tylenol  PO  





  Q4H PRN  





  Pain MILD(1-3)/Fever >100.5/HA  


 


Albuterol  2.5 mg  06/25/19 17:35 





  Proventil  IH  





  Q4HRT PRN  





  Shortness Of Breath  


 


Famotidine  20 mg  06/25/19 22:00  06/26/19 10:35





  Pepcid  PO   20 mg





  BID ALVERTO   Administration


 


Losartan Potassium  25 mg  06/27/19 10:00 





  Cozaar  PO  





  QDAY ALVERTO  


 


Methylprednisolone Sodium Succinate  20 mg  06/26/19 10:00  06/26/19 10:35





  Solu-Medrol  IV   20 mg





  Q12HR ALVERTO   Administration


 


Ondansetron HCl  4 mg  06/25/19 17:35 





  Zofran  IV  





  Q8H PRN  





  Nausea And Vomiting  


 


Oxycodone HCl  5 mg  06/25/19 20:42  06/26/19 14:55





  Roxicodone  PO   5 mg





  Q4H PRN   Administration





  Pain, Moderate (4-6)  


 


Sodium Chloride  10 ml  06/25/19 22:00  06/26/19 10:35





  Sodium Chloride Flush Syringe 10 Ml  IV   10 ml





  BID ALVERTO   Administration


 


Sodium Chloride  10 ml  06/25/19 17:35 





  Sodium Chloride Flush Syringe 10 Ml  IV  





  PRN PRN  





  LINE FLUSH

## 2019-06-26 NOTE — CONSULTATION
<SAKSHI JO - Last Filed: 06/26/19 12:36>





History of Present Illness


Consult date: 06/26/19


Consult reason: congestive heart failure


History of present illness: 





This is a 54-year old male who recently transition from [place] to [place]. Patient

reports a history of non-ischemic cardiomyopathy and has an indwelling cardiac 

defibrillator. 5-6 months ago, patient states he had a repeat cardiac cath that 

he reports as normal. No coronary intervention required. Patient also gives a 

history of paroxysmal atrial fibrillation, on Xarelto for oral anticoagulation. 







He presents to this hospital with complaints of left sided flank pain and chest 

pain. Patient reports a history of left ribs fractures that occurred in February

of this year and reports he suffered a ruptured diagram in April. Chest CT scan 

reports a large lipoma versus lipomatosis of the left pleural space, moderate 

emphysematous changes and multiple left healed fractured ribs.





A cardiac consultation has been requested for CHF. Patient denies unusual 

shortness of breath and there is no lower extremity edema. Patient denies AICD 

discharge. A chest x-ray reports small pleural effusion but no evidence of 

interstitial edema. His ECG is sinus rhythm, no acute ischemic changes.





Past History


Social history: single.  denies: smoking, alcohol abuse, prescription drug abuse


Family history: diabetes, hypertension





Medications and Allergies


                                    Allergies











Allergy/AdvReac Type Severity Reaction Status Date / Time


 


doxycycline Allergy  Hives Verified 06/25/19 12:40


 


lisinopril Allergy  Anaphylaxis Verified 06/25/19 12:39


 


spironolactone Allergy  Hives Verified 06/25/19 12:40











                                Home Medications











 Medication  Instructions  Recorded  Confirmed  Last Taken  Type


 


Albuterol 0.63% NEBS 2.5 mg INHALATION Q4H PRN 06/26/19 06/26/19 2 Days Ago 

History





    ~06/24/19 





    2.5MG 


 


Amiodarone 200 mg PO QDAY 06/26/19 06/26/19 2 Days Ago History





    ~06/24/19 





    200MG 


 


Cymbalta 60 mg PO QDAY 06/26/19 06/26/19 2 Days Ago History





    ~06/24/19 





    60MG 


 


Dulera 200 Mcg/5 Mcg Inhaler 200 mcg INHALATION BID 06/26/19 06/26/19 1 Day Ago 

History





    ~06/25/19 





    2 PUFFS 


 


Lasix TAB 40 mg PO QDAY 06/26/19 06/26/19 2 Days Ago History





    ~06/24/19 





    40MG 


 


Lopressor TAB 25 mg PO BID 06/26/19 06/26/19 1 Day Ago History





    ~06/25/19 





    25MG 


 


ProAir HFA Inhaler 2 puff IH Q4HR PRN 06/26/19 06/26/19 1 Day Ago History





    ~06/25/19 





    2 PUFFS 


 


Xarelto 10 mg PO QDAY 06/26/19 06/26/19 2 Days Ago History





    ~06/24/19 











Active Meds: 


Active Medications





Acetaminophen (Tylenol)  650 mg PO Q4H PRN


   PRN Reason: Pain MILD(1-3)/Fever >100.5/HA


Albuterol (Proventil)  2.5 mg IH Q4HRT PRN


   PRN Reason: Shortness Of Breath


Famotidine (Pepcid)  20 mg PO BID [facility]


   Last Admin: 06/26/19 10:35 Dose:  20 mg


   Documented by: 


Methylprednisolone Sodium Succinate (Solu-Medrol)  20 mg IV Q12HR [facility]


   Last Admin: 06/26/19 10:35 Dose:  20 mg


   Documented by: 


Ondansetron HCl (Zofran)  4 mg IV Q8H PRN


   PRN Reason: Nausea And Vomiting


Oxycodone HCl (Roxicodone)  5 mg PO Q4H PRN


   PRN Reason: Pain, Moderate (4-6)


   Last Admin: 06/26/19 10:35 Dose:  5 mg


   Documented by: 


Sodium Chloride (Sodium Chloride Flush Syringe 10 Ml)  10 ml IV BID [facility]


   Last Admin: 06/26/19 10:35 Dose:  10 ml


   Documented by: 


Sodium Chloride (Sodium Chloride Flush Syringe 10 Ml)  10 ml IV PRN PRN


   PRN Reason: LINE FLUSH











Physical Examination


                                   Vital Signs











Temp Pulse Resp BP Pulse Ox


 


 98 F   87   18   129/89   93 


 


 06/25/19 18:29  06/25/19 18:29  06/25/19 18:29  06/25/19 18:29  06/25/19 18:29











General appearance: no acute distress


HEENT: Positive: PERRL


Neck: Positive: trachea midline


Cardiac: Positive: Reg Rate and Rhythm


Lungs: Positive: Decreased Breath Sounds


Neuro: Positive: Grossly Intact


Extremities: Absent: edema





Results





                                 06/26/19 05:06





                                 06/26/19 05:06


                                 Cardiac Enzymes











  06/25/19 06/26/19 Range/Units





  13:04 05:06 


 


AST  37  32  (5-40)  units/L








                                       CBC











  06/25/19 06/26/19 Range/Units





  13:04 05:06 


 


WBC  4.9  3.9 L  (4.5-11.0)  K/mm3


 


RBC  5.16 H  4.65  (3.65-5.03)  M/mm3


 


Hgb  14.2  12.7  (11.8-15.2)  gm/dl


 


Hct  43.2  38.6  (35.5-45.6)  %


 


Plt Count  205  183  (140-440)  K/mm3








                          Comprehensive Metabolic Panel











  06/25/19 06/26/19 Range/Units





  13:04 05:06 


 


Sodium  143  142  (137-145)  mmol/L


 


Potassium  4.7  4.4  (3.6-5.0)  mmol/L


 


Chloride  106.6  105.6  ()  mmol/L


 


Carbon Dioxide  23  23  (22-30)  mmol/L


 


BUN  11  14  (9-20)  mg/dL


 


Creatinine  1.2  1.3  (0.8-1.5)  mg/dL


 


Glucose  85  88  ()  mg/dL


 


Calcium  9.6  8.7  (8.4-10.2)  mg/dL


 


AST  37  32  (5-40)  units/L


 


ALT  20  18  (7-56)  units/L


 


Alkaline Phosphatase  117  106  ()  units/L


 


Total Protein  7.0  6.0 L  (6.3-8.2)  g/dL


 


Albumin  3.8 L  3.4 L  (3.9-5)  g/dL














Assessment and Plan





Left sided flank pain


   Chest CT scan reports a large lipoma versus lipomatosis of the left pleural 

space, moderate 


   emphysematous changes and multiple left healed fractured ribs.


Atypical chest pain


Hx of COPD


Hx of Nonischemic cardiomyopathy


Presence of AICD


Hx of paroxysmal Afib


   on Xarelto as an outpatient.


Tobacco abuse





Recommendations:


Resume home medications.


Obtain prior cardiac records for review.


Echocardiogram for LVEF assessment.





<PJ GUTIERREZ - Last Filed: 06/26/19 13:01>





Medications and Allergies


Active Meds: 


Active Medications





Acetaminophen (Tylenol)  650 mg PO Q4H PRN


   PRN Reason: Pain MILD(1-3)/Fever >100.5/HA


Albuterol (Proventil)  2.5 mg IH Q4HRT PRN


   PRN Reason: Shortness Of Breath


Famotidine (Pepcid)  20 mg PO BID [facility]


   Last Admin: 06/26/19 10:35 Dose:  20 mg


   Documented by: 


Losartan Potassium (Cozaar)  25 mg PO QDAY [facility]


Methylprednisolone Sodium Succinate (Solu-Medrol)  20 mg IV Q12HR [facility]


   Last Admin: 06/26/19 10:35 Dose:  20 mg


   Documented by: 


Ondansetron HCl (Zofran)  4 mg IV Q8H PRN


   PRN Reason: Nausea And Vomiting


Oxycodone HCl (Roxicodone)  5 mg PO Q4H PRN


   PRN Reason: Pain, Moderate (4-6)


   Last Admin: 06/26/19 10:35 Dose:  5 mg


   Documented by: 


Sodium Chloride (Sodium Chloride Flush Syringe 10 Ml)  10 ml IV BID [facility]


   Last Admin: 06/26/19 10:35 Dose:  10 ml


   Documented by: 


Sodium Chloride (Sodium Chloride Flush Syringe 10 Ml)  10 ml IV PRN PRN


   PRN Reason: LINE FLUSH











Physical Examination


                                   Vital Signs











Temp Pulse Resp BP Pulse Ox


 


 98 F   87   18   129/89   93 


 


 06/25/19 18:29  06/25/19 18:29  06/25/19 18:29  06/25/19 18:29  06/25/19 18:29














Results





                                 06/26/19 05:06





                                 06/26/19 05:06


                                 Cardiac Enzymes











  06/25/19 06/26/19 Range/Units





  13:04 05:06 


 


AST  37  32  (5-40)  units/L








                                       CBC











  06/25/19 06/26/19 Range/Units





  13:04 05:06 


 


WBC  4.9  3.9 L  (4.5-11.0)  K/mm3


 


RBC  5.16 H  4.65  (3.65-5.03)  M/mm3


 


Hgb  14.2  12.7  (11.8-15.2)  gm/dl


 


Hct  43.2  38.6  (35.5-45.6)  %


 


Plt Count  205  183  (140-440)  K/mm3








                          Comprehensive Metabolic Panel











  06/25/19 06/26/19 Range/Units





  13:04 05:06 


 


Sodium  143  142  (137-145)  mmol/L


 


Potassium  4.7  4.4  (3.6-5.0)  mmol/L


 


Chloride  106.6  105.6  ()  mmol/L


 


Carbon Dioxide  23  23  (22-30)  mmol/L


 


BUN  11  14  (9-20)  mg/dL


 


Creatinine  1.2  1.3  (0.8-1.5)  mg/dL


 


Glucose  85  88  ()  mg/dL


 


Calcium  9.6  8.7  (8.4-10.2)  mg/dL


 


AST  37  32  (5-40)  units/L


 


ALT  20  18  (7-56)  units/L


 


Alkaline Phosphatase  117  106  ()  units/L


 


Total Protein  7.0  6.0 L  (6.3-8.2)  g/dL


 


Albumin  3.8 L  3.4 L  (3.9-5)  g/dL














Assessment and Plan





I've seen and evaluated the patient and agree with the assessment and plan as 

above.  Patient has a history of atypical chest pain, COPD, nonischemic 

cardiomyopathy with the presence of an AICD, and a history of paroxysmal atrial 

fibrillation on Xarelto as an outpatient for CVA prophylaxis.  At this time the 

patient has been off of his medical therapy.  We will restart the patient's 

medical therapy.  Will check echocardiogram for LV function.

## 2019-06-26 NOTE — CONSULTATION
History of Present Illness


Consult date: 06/26/19


Reason for consult: dyspnea, cough, chest pain, COPD, pleural effusion, other 

(Left rib fractures and left diaphragmatic hernia.)


History of present illness: 





                                                     PULMONARY AND CRITICAL CARE

CONSULTATION





DR. Gamino THANK YOU FOR ASKING US TO PARTICIPATE IN THE CARE OF THIS  PATIENT.





53 YO Male with COPD, CHF, Obesity Hypoventilation presents to ED for 

evaluation. Pt states that he has experienced pain to his left flank over the 

past 3 days with worsening symptoms over the past 2 days. Pt reports coughing 

episodes followed by pain to the left flank. Pt also reports diaphragmatic 

injury 3 months ago and that the was informed that he may need surgery. Pt 

acknowledges shortness of breath, and nonproductive cough, increased nebulizer 

therapy without relief. Pt also reports dypsnea on exertion, dypsnea at rest, 

decreased exercise tolerance, Orthopnea/PND, as well as leg edema. Pt denies fev

er, chills, CP, Palpitations, NVD, Trauma, BRBPR, Prolonged travel/immobility, 

Unilateral leg swelling, calf pain, or recent ill contacts. Pt denies compliance

with cardiac diet as well as medication. Pt unable to recall his prescribed 

medication. Pt transported to [facility] via private vehicle. Pt seen and evaluated in 

ED and found to have symptoms consistent with CHF Decompensation, as well as 

COPD Exacerbation. Pt admitted to telemetry. CT Chest pending at time of 

admission. No prior admission for review. No medication listed for 

reconciliation at time of admission. 


Patient still complaining left chest pain and left flank pain with cough.


Patient presently on room air . O2 saturation 93%.


Patient has history of smoking 1 to 2 cigaretts a day for 40 years.


Denies alcohol or drug abuse.


Worked different jobs, labor and resturant job.


Patient  and has 3 children.


Allergic to doxycycline,Lisonipril, Spiranolactone.


CAT scan of chest done on 6/25/19





IMPRESSION: 





Large fat density visualized left pleural space inferiorly, laterally and 

posteriorly as described.


This may represent a large lipoma or lipomatosis of the pleural space. 

Liposarcoma cannot entirely 


excluded. Comparison with prior CT scans would be helpful. 





Moderate emphysematous changes present diffusely bilaterally, greatest in the 

upper lobes. 





Pacemaker in place as described. 





Subtle increased density dependently in the gallbladder. There may be sludge or 

noncalcified 


gallstones. Consider follow-up gallbladder ultrasound. 





Left rib fractures as described. 





Records from [facility] in [place] CAT scan done 6/2/19 reported 

diaphragmatic hernia with herniation of large Omental fat in the left lower 

chest. Reported consistent with diaphragmatic hernia. Old left rib fractures. 

Emphysematous changes in both lungs.











Past History


Past Medical History: COPD, heart failure, other (Obesity Hypoventilation, 

Diaphragmatic injury,)


Past Surgical History: Other (Embelectomy)


Social history: single, smoking.  denies: alcohol abuse, prescription drug abuse


Family history: diabetes, hypertension





Medications and Allergies


                                    Allergies











Allergy/AdvReac Type Severity Reaction Status Date / Time


 


doxycycline Allergy  Hives Verified 06/25/19 12:40


 


lisinopril Allergy  Anaphylaxis Verified 06/25/19 12:39


 


spironolactone Allergy  Hives Verified 06/25/19 12:40











                                Home Medications











 Medication  Instructions  Recorded  Confirmed  Last Taken  Type


 


Albuterol 0.63% NEBS 2.5 mg INHALATION Q4H PRN 06/26/19 06/26/19 2 Days Ago 

History





    ~06/24/19 





    2.5MG 


 


Amiodarone 200 mg PO QDAY 06/26/19 06/26/19 2 Days Ago History





    ~06/24/19 





    200MG 


 


Cymbalta 60 mg PO QDAY 06/26/19 06/26/19 2 Days Ago History





    ~06/24/19 





    60MG 


 


Dulera 200 Mcg/5 Mcg Inhaler 200 mcg INHALATION BID 06/26/19 06/26/19 1 Day Ago 

History





    ~06/25/19 





    2 PUFFS 


 


Lasix TAB 40 mg PO QDAY 06/26/19 06/26/19 2 Days Ago History





    ~06/24/19 





    40MG 


 


Lopressor TAB 25 mg PO BID 06/26/19 06/26/19 1 Day Ago History





    ~06/25/19 





    25MG 


 


ProAir HFA Inhaler 2 puff IH Q4HR PRN 06/26/19 06/26/19 1 Day Ago History





    ~06/25/19 





    2 PUFFS 


 


Xarelto 10 mg PO QDAY 06/26/19 06/26/19 2 Days Ago History





    ~06/24/19 











Active Meds: 


Active Medications





Acetaminophen (Tylenol)  650 mg PO Q4H PRN


   PRN Reason: Pain MILD(1-3)/Fever >100.5/HA


Albuterol (Proventil)  2.5 mg IH Q4HRT PRN


   PRN Reason: Shortness Of Breath


Famotidine (Pepcid)  20 mg PO BID [facility]


   Last Admin: 06/26/19 10:35 Dose:  20 mg


   Documented by: 


Losartan Potassium (Cozaar)  25 mg PO QDAY [facility]


Methylprednisolone Sodium Succinate (Solu-Medrol)  20 mg IV Q12HR [facility]


   Last Admin: 06/26/19 10:35 Dose:  20 mg


   Documented by: 


Ondansetron HCl (Zofran)  4 mg IV Q8H PRN


   PRN Reason: Nausea And Vomiting


Oxycodone HCl (Roxicodone)  5 mg PO Q4H PRN


   PRN Reason: Pain, Moderate (4-6)


   Last Admin: 06/26/19 14:55 Dose:  5 mg


   Documented by: 


Sodium Chloride (Sodium Chloride Flush Syringe 10 Ml)  10 ml IV BID [facility]


   Last Admin: 06/26/19 10:35 Dose:  10 ml


   Documented by: 


Sodium Chloride (Sodium Chloride Flush Syringe 10 Ml)  10 ml IV PRN PRN


   PRN Reason: LINE FLUSH











Review of Systems


All systems: negative





Physical Examination


Vital signs: 


                                   Vital Signs











Temp Pulse Resp BP Pulse Ox


 


 98 F   87   18   129/89   93 


 


 06/25/19 18:29  06/25/19 18:29  06/25/19 18:29  06/25/19 18:29  06/25/19 18:29











General appearance: no acute distress, alert


Eyes: non-icteric


ENT: oropharynx moist


Neck: supple, no lymphadenopathy, no JVD


Ascultation: Left: diminished breath sounds


Cardiovascular: regular rate and rhythm


Gastrointestinal: normoactive bowel sounds, soft, tender


Integumentary: normal


Extremities: no cyanosis, no edema


Musculoskeletal: no deformities


Gait: other (Can not assess.)


normal mental status, non-focal exam, pupils equal and round, CN II-XII normal


anxious





Results





- Laboratory Findings


CBC and BMP: 


                                 06/26/19 05:06





                                 06/26/19 05:06


Abnormal lab findings: 


                                  Abnormal Labs











  06/25/19 06/25/19 06/25/19





  01:30 13:04 13:04


 


WBC   


 


RBC   5.16 H 


 


MCV   


 


MCH   


 


RDW   17.0 H 


 


Monocytes % (Manual)   12.0 H 


 


Lymphocytes # (Manual)   1.0 L 


 


NT-Pro-B Natriuret Pep   


 


Total Protein   


 


Albumin    3.8 L


 


Free T4   


 


Ur Specific Gravity  > 1.030 H  














  06/25/19 06/25/19 06/26/19





  19:43 19:43 05:06


 


WBC    3.9 L


 


RBC   


 


MCV    83 L


 


MCH    27 L


 


RDW    16.3 H


 


Monocytes % (Manual)    20.0 H


 


Lymphocytes # (Manual)    0.9 L


 


NT-Pro-B Natriuret Pep  913.6 H  


 


Total Protein   


 


Albumin   


 


Free T4   1.63 H 


 


Ur Specific Gravity   














  06/26/19





  05:06


 


WBC 


 


RBC 


 


MCV 


 


MCH 


 


RDW 


 


Monocytes % (Manual) 


 


Lymphocytes # (Manual) 


 


NT-Pro-B Natriuret Pep 


 


Total Protein  6.0 L


 


Albumin  3.4 L


 


Free T4 


 


Ur Specific Gravity 














- Diagnostic Findings


Chest x-ray: report reviewed (Small left pleural effusion reported.Pacemaker 

presentation.), image reviewed


CT scan - chest: report reviewed, image reviewed


Additional studies: 


Angio CT of chest done  6/25/19 reported.


MPRESSION: 





Large fat density visualized left pleural space inferiorly, laterally and 

posteriorly as described.


This may represent a large lipoma or lipomatosis of the pleural space. 

Liposarcoma cannot entirely 


excluded. Comparison with prior CT scans would be helpful. 





Moderate emphysematous changes present diffusely bilaterally, greatest in the 

upper lobes. 





Pacemaker in place as described. 





Subtle increased density dependently in the gallbladder. There may be sludge or 

noncalcified 


gallstones. Consider follow-up gallbladder ultrasound. 





Left rib fractures as described. 








Assessment and Plan





53 YO Male with COPD, CHF, Obesity Hypoventilation presents to ED for 

evaluation. Pt states that he has experienced pain to his left flank over the 

past 3 days with worsening symptoms over the past 2 days. Pt reports coughing 

episodes followed by pain to the left flank. Pt also reports diaphragmatic 

injury 3 months ago and that the was informed that he may need surgery. Pt 

acknowledges shortness of breath, and nonproductive cough, increased nebulizer 

therapy without relief. Pt also reports dypsnea on exertion, dypsnea at rest, 

decreased exercise tolerance, Orthopnea/PND, as well as leg edema. Pt denies fev

er, chills, CP, Palpitations, NVD, Trauma, BRBPR, Prolonged travel/immobility, 

Unilateral leg swelling, calf pain, or recent ill contacts. Pt denies compliance

with cardiac diet as well as medication. Pt unable to recall his prescribed 

medication. Pt transported to [facility] via private vehicle. Pt seen and evaluated in 

ED and found to have symptoms consistent with CHF Decompensation, as well as 

COPD Exacerbation. Pt admitted to telemetry. CT Chest pending at time of 

admission. No prior admission for review. No medication listed for 

reconciliation at time of admission. 


Patient still complaining left chest pain and left flank pain with cough.


Patient presently on room air . O2 saturation 93%.


Patient has history of smoking 1 to 2 cigaretts a day for 40 years.


Denies alcohol or drug abuse.


Patient  and has 3 children.


Allergic to doxycycline,Lisonipril, Spiranolactone.


CAT scan of chest done on 6/25/19





IMPRESSION: 





Large fat density visualized left pleural space inferiorly, laterally and 

posteriorly as described.


This may represent a large lipoma or lipomatosis of the pleural space. 

Liposarcoma cannot entirely 


excluded. Comparison with prior CT scans would be helpful. 





Moderate emphysematous changes present diffusely bilaterally, greatest in the 

upper lobes. 





Pacemaker in place as described. 





Subtle increased density dependently in the gallbladder. There may be sludge or 

noncalcified 


gallstones. Consider follow-up gallbladder ultrasound. 





Left rib fractures as described. 





Records from [facility] CAT scan done 6/2/19 reported 

diaphragmatic hernia with herniation of large Omental fat in the left lower 

chest. Reported consistent with diaphragmatic hernia. Old left rib fractures. 

Emphysematous changes in both lungs.





Recommend Thoracic surgery consultation.





- Patient Problems


(1) Acute exacerbation of chronic obstructive pulmonary disease (COPD)


Current Visit: Yes   Status: Acute   


Plan to address problem: 


O2 2 litres O2.


 Albuterol/atrovent aerosol treatments q 6 hours.


 Continue I/V solumedrol


 Continue famotidine.


 SCDs


 Recommend S/C Lovenox.


ABGs on room air








(2) CHF (congestive heart failure)


Current Visit: Yes   Status: Acute   


Qualifiers: 


   Heart failure chronicity: acute on chronic 


Plan to address problem: 


Management as per cardiology.








(3) Pleural effusion


Current Visit: Yes   Status: Acute   


Plan to address problem: 


CT of the chest reported Omental  fat.








(4) Diaphragmatic hernia


Current Visit: Yes   Status: Acute   


Plan to address problem: 


With herniation of fat into the left lower chest.


 Recommend thoracic surgery evaluation.

## 2019-06-27 RX ADMIN — ALBUTEROL SULFATE PRN MG: 2.5 SOLUTION RESPIRATORY (INHALATION) at 09:44

## 2019-06-27 RX ADMIN — AMIODARONE HYDROCHLORIDE SCH MG: 200 TABLET ORAL at 15:45

## 2019-06-27 RX ADMIN — METOPROLOL TARTRATE SCH MG: 25 TABLET, FILM COATED ORAL at 21:30

## 2019-06-27 RX ADMIN — LOSARTAN POTASSIUM SCH MG: 25 TABLET, FILM COATED ORAL at 09:25

## 2019-06-27 RX ADMIN — MORPHINE SULFATE PRN MG: 2 INJECTION, SOLUTION INTRAMUSCULAR; INTRAVENOUS at 14:41

## 2019-06-27 RX ADMIN — MORPHINE SULFATE PRN MG: 2 INJECTION, SOLUTION INTRAMUSCULAR; INTRAVENOUS at 19:34

## 2019-06-27 RX ADMIN — OXYCODONE PRN MG: 5 TABLET ORAL at 02:19

## 2019-06-27 RX ADMIN — Medication SCH ML: at 09:26

## 2019-06-27 RX ADMIN — Medication SCH ML: at 21:33

## 2019-06-27 RX ADMIN — FAMOTIDINE SCH MG: 20 TABLET ORAL at 21:30

## 2019-06-27 RX ADMIN — FUROSEMIDE SCH MG: 40 TABLET ORAL at 15:45

## 2019-06-27 RX ADMIN — ALBUTEROL SULFATE PRN MG: 2.5 SOLUTION RESPIRATORY (INHALATION) at 17:40

## 2019-06-27 RX ADMIN — FAMOTIDINE SCH MG: 20 TABLET ORAL at 09:25

## 2019-06-27 NOTE — PROGRESS NOTE
Assessment and Plan





Left sided flank pain


   hx of left diaphragmatic hernia


   Chest CT scan reports a large lipoma versus lipomatosis of the left pleural 

space, moderate 


   emphysematous changes and multiple left healed fractured ribs.


Atypical chest pain


Hx of COPD


Hx of Nonischemic cardiomyopathy


   EF 45-50% by echo done 3/2019 at [facility] reports no significant CAD 4/2019 at [facility]


Presence of AICD


Hx of PE -on Xarelto as an outpatient.


Hx of paroxysmal Afib per pt


   on amiodarone and Xarelto as an outpatient


Tobacco abuse





Recommendations:


Resume home medications for paroxysmal afib and nonischemic cardiomyopathy.


Echocardiogram for LVEF assessment.





Subjective


Date of service: 06/27/19


Interval history: 





Records received and reviewed.





Objective


                                   Vital Signs











  Temp Pulse Pulse Pulse Resp Resp BP


 


 06/27/19 09:46       


 


 06/27/19 08:22   77     


 


 06/27/19 07:42  97.3 F L  79    18   131/90


 


 06/27/19 05:00   80     


 


 06/27/19 04:12    80    18 


 


 06/27/19 03:59  97.9 F  80    20   121/86


 


 06/26/19 23:59  97.9 F  80    20   119/81


 


 06/26/19 21:00   81   81  20  


 


 06/26/19 20:12  97.4 F L  81    20   139/94


 


 06/26/19 16:02  97.7 F     18   140/97


 


 06/26/19 12:04       














  Pulse Ox


 


 06/27/19 09:46  96


 


 06/27/19 08:22 


 


 06/27/19 07:42  95


 


 06/27/19 05:00 


 


 06/27/19 04:12 


 


 06/27/19 03:59  92


 


 06/26/19 23:59  95


 


 06/26/19 21:00 


 


 06/26/19 20:12  94


 


 06/26/19 16:02 


 


 06/26/19 12:04  95














- Physical Examination


General: No Apparent Distress


HEENT: Positive: PERRL


Neck: Positive: trachea midline


Cardiac: Positive: Reg Rate and Rhythm


Lungs: Positive: Decreased Breath Sounds


Neuro: Positive: Grossly Intact


Extremities: Absent: edema

## 2019-06-27 NOTE — PROGRESS NOTE
Assessment and Plan








Acute exacerbation of chronic obstructive pulmonary disease (COPD)


CHF (congestive heart failure)


Obesity hypoventilation syndrome


Diaphragm injury


hx of RIB fracture


DVT prophylaxis





- continue bronchodilators with pulmonary hygiene per RT


- contibnue systemic steroids with slow taper


- continue empiric AB's


- supplemental oxygen as needed to keep O2 sats > 90%


- prn analgesia; rib fractures appear old and healing; ? steroid complication


- GI & VTE prophylaxis


- continue other car per attending / other consultants





... re-evaluate in am & prn
































Subjective


Date of service: 06/27/19


Principal diagnosis: Ac. exacerbation of COPD; CHF; OHS; Diaphragm injury; hx of

RIB fracture


Interval history: 





Patient is seen today for: Acute exacerbation of chronic obstructive pulmonary 

disease (COPD); CHF (congestive heart failure); Obesity hypoventilation 

syndrome; Diaphragm injury; hx of RIB fracture





Seen and examined at bedside; 24hour events reviewed; nursing and respiratory 

care staff consulted; no adverse overnight events reported to me; resting 

peacefully in bed; less SOB; still anxious; denies acute chest pains or 

palpitations; No N/V/F/C





Objective


                               Vital Signs - 12hr











  06/27/19 06/27/19 06/27/19





  03:59 04:12 05:00


 


Temperature 97.9 F  


 


Pulse Rate 80  80


 


Pulse Rate [  80 





Bilateral Lower   





Lobe]   


 


Respiratory 20  





Rate   


 


Respiratory  18 





Rate [Bilateral   





Lower Lobe]   


 


Blood Pressure 121/86  


 


O2 Sat by Pulse 92  





Oximetry   














  06/27/19 06/27/19 06/27/19





  07:42 08:22 09:46


 


Temperature 97.3 F L  


 


Pulse Rate 79 77 


 


Pulse Rate [   





Bilateral Lower   





Lobe]   


 


Respiratory 18  





Rate   


 


Respiratory   





Rate [Bilateral   





Lower Lobe]   


 


Blood Pressure 131/90  


 


O2 Sat by Pulse 95  96





Oximetry   














  06/27/19





  11:19


 


Temperature 97.6 F


 


Pulse Rate 89


 


Pulse Rate [ 





Bilateral Lower 





Lobe] 


 


Respiratory 18





Rate 


 


Respiratory 





Rate [Bilateral 





Lower Lobe] 


 


Blood Pressure 127/82


 


O2 Sat by Pulse 98





Oximetry 











Constitutional: appears uncomfortable, other (middle aged obese AAM with mildly 

increased resp effort at rest)


Eyes: non-icteric


ENT: oropharynx moist, other (mallampati 3)


Neck: supple, no lymphadenopathy, no JVD, other (large neck circumference)


Effort: mildly labored


Ascultation: Bilateral: diminished breath sounds, rhonchi (scant in bases)


Percussion: Bilateral: not dull


Cardiovascular: regular rate and rhythm


Gastrointestinal: normoactive bowel sounds, soft, non-tender, non-distended


Integumentary: normal


Extremities: no cyanosis, no edema, pulses normal, no ischemia or petechiae


Neurologic: normal mental status, non-focal exam, pupils equal and round, motor 

strength normal and


Psychiatric: anxious


CBC and BMP: 


                                 06/26/19 05:06





                                 06/26/19 05:06


Abnormal lab findings: 


                                  Abnormal Labs











  06/25/19 06/25/19 06/25/19





  01:30 13:04 13:04


 


WBC   


 


RBC   5.16 H 


 


MCV   


 


MCH   


 


RDW   17.0 H 


 


Monocytes % (Manual)   12.0 H 


 


Lymphocytes # (Manual)   1.0 L 


 


NT-Pro-B Natriuret Pep   


 


Total Protein   


 


Albumin    3.8 L


 


Free T4   


 


Ur Specific Gravity  > 1.030 H  














  06/25/19 06/25/19 06/26/19





  19:43 19:43 05:06


 


WBC    3.9 L


 


RBC   


 


MCV    83 L


 


MCH    27 L


 


RDW    16.3 H


 


Monocytes % (Manual)    20.0 H


 


Lymphocytes # (Manual)    0.9 L


 


NT-Pro-B Natriuret Pep  913.6 H  


 


Total Protein   


 


Albumin   


 


Free T4   1.63 H 


 


Ur Specific Gravity   














  06/26/19





  05:06


 


WBC 


 


RBC 


 


MCV 


 


MCH 


 


RDW 


 


Monocytes % (Manual) 


 


Lymphocytes # (Manual) 


 


NT-Pro-B Natriuret Pep 


 


Total Protein  6.0 L


 


Albumin  3.4 L


 


Free T4 


 


Ur Specific Gravity 











CT scan - chest: image reviewed (upper lobe bullous emphysema; eventration of 

left hemidiaphragm)


Allied health notes reviewed: nursing

## 2019-06-27 NOTE — PROGRESS NOTE
Assessment and Plan


Assessment and plan: 


55 YO Male with COPD, CHF, Obesity Hypoventilation presents to ED for 

evaluation. Pt states that he has experienced pain to his left flank over the 

past 3 days with worsening symptoms over the past 2 days. Pt reports coughing 

episodes followed by pain to the left flank. Pt also reports diaphragmatic inju

ry 3 months ago and that the was informed that he may need surgery. Pt 

acknowledges shortness of breath, and nonproductive cough, increased nebulizer 

therapy without relief. Pt also reports dypsnea on exertion, dypsnea at rest, 

decreased exercise tolerance, Orthopnea/PND, as well as leg edema. Pt denies 

fever, chills, CP, Palpitations, NVD, Trauma, BRBPR, Prolonged 

travel/immobility, Unilateral leg swelling, calf pain, or recent ill contacts. 

Pt denies compliance with cardiac diet as well as medication. Pt unable to 

recall his prescribed medication. Pt transported to [facility] via private vehicle. Pt 

seen and evaluated in ED and found to have symptoms consistent with CHF 

Decompensation, as well as COPD Exacerbation. Pt admitted to telemetry. CT Chest

pending at time of admission. No prior admission for review. No medication 

listed for reconciliation at time of admission. 





- Patient Problems


(1) CHF (congestive heart failure)


Current Visit: Yes   Status: Acute   


Qualifiers: 


   Heart failure chronicity: acute on chronic 


Plan to address problem: 


Continue current management, await cardiology consult, Echo, strict I/O, daily 

weight, bnp, thyroid panel, magnesium level, chest x ray, monitor uop q shift, 

monitor blood pressure q shift, afterload reduction. pulse oximetry








(2) Obesity hypoventilation syndrome


Current Visit: Yes   Status: Acute   


Plan to address problem: 


supplemental oxygen, nebulizer therapy, NIPPV as clinically indicated.








(3) Acute exacerbation of chronic obstructive pulmonary disease (COPD)


Current Visit: Yes   Status: Acute   


Plan to address problem: 


supplemental oxygen, nebulizer therapy, steroid therapy, 


pULMONARY CONSULT





(4) Diaphragm injury-POA


Current Visit: Yes   Status: Chronic   


Qualifiers: 


   Encounter type: initial encounter   Qualified Code(s): S27.809A - Unspecified

injury of diaphragm, initial encounter   


Plan to address problem: 


CT abdomen pelvis, supportive care, 








(5) hx of RIB fracture-left and also non union formation-poa


Continue current supportive care treatment


CT Surgeon on discharge





(6)Right femoral head avascular necrosis-POA


?Etiology. Hematology eval





PLERUTIC CHEST PAIN


secondary to rib fracture





DVT prophylaxis


Current Visit: Yes   Status: Acute   


Plan to address problem: 


SCD to BLE while in bed, hold anticoagulation until clarification of nature of 

diaphragm injury.











History


Interval history: 


Patient seen and examined this morning, still reports abdominal pain, chronic 

per the patient but intermittent.








Hospitalist Physical





- Physical exam


Narrative exam: 


- EENT


Eyes: Present: PERRL


ENT: hearing intact, clear oral mucosa





- Neck


Neck: Present: supple, normal ROM





- Respiratory


Respiratory effort: normal


Respiratory: bilateral: diminished, rhonchi





- Cardiovascular


Heart Sounds: Present: S1 & S2.  Absent: rub, click





- Extremities


Extremities: pulses symmetrical


Extremity abnormal: edema


Peripheral Pulses: within normal limits





- Abdominal


General gastrointestinal: Present: soft, noted tender today, LLQ non-distended, 

normal bowel sounds


Male genitourinary: Present: normal





- Integumentary


Integumentary: Present: clear, warm, dry





- Musculoskeletal


Musculoskeletal: gait normal, strength equal bilaterally





- Psychiatric


Psychiatric: appropriate mood/affect, intact judgment & insight





- Neurologic


Neurologic: CNII-XII intact, moves all extremities





- Additional findings


Additional findings: 





Left chest wall tenderness with palpation in area of the left flank at site of 

previous rib fractures. 








- Constitutional


Vitals: 


                                        











Temp Pulse Resp BP Pulse Ox


 


 97.6 F   83   18   128/89   93 


 


 06/27/19 15:34  06/27/19 15:34  06/27/19 15:34  06/27/19 15:34  06/27/19 15:34











General appearance: Present: no acute distress





Results





- Labs


CBC & Chem 7: 


                                 06/26/19 05:06





                                 06/26/19 05:06


Labs: 


                             Laboratory Last Values











WBC  3.9 K/mm3 (4.5-11.0)  L  06/26/19  05:06    


 


RBC  4.65 M/mm3 (3.65-5.03)   06/26/19  05:06    


 


Hgb  12.7 gm/dl (11.8-15.2)   06/26/19  05:06    


 


Hct  38.6 % (35.5-45.6)   06/26/19  05:06    


 


MCV  83 fl (84-94)  L  06/26/19  05:06    


 


MCH  27 pg (28-32)  L  06/26/19  05:06    


 


MCHC  33 % (32-34)   06/26/19  05:06    


 


RDW  16.3 % (13.2-15.2)  H  06/26/19  05:06    


 


Plt Count  183 K/mm3 (140-440)   06/26/19  05:06    


 


Mono % (Auto)  Np   06/26/19  05:06    


 


Add Manual Diff  Complete   06/26/19  05:06    


 


Total Counted  100   06/26/19  05:06    


 


Seg Neuts % (Manual)  52.0 % (40.0-70.0)   06/26/19  05:06    


 


  0 %  06/26/19  05:06    


 


  23.0 % (13.4-35.0)   06/26/19  05:06    


 


Reactive Lymphs % (Man)  0 %  06/26/19  05:06    


 


  20.0 % (0.0-7.3)  H  06/26/19  05:06    


 


  4.0 % (0.0-4.3)   06/26/19  05:06    


 


  1.0 % (0.0-1.8)   06/26/19  05:06    


 


  0 %  06/26/19  05:06    


 


  0 %  06/26/19  05:06    


 


  0 %  06/26/19  05:06    


 


  0 %  06/26/19  05:06    


 


Nucleated RBC %  Not Reportable   06/26/19  05:06    


 


Seg Neutrophils # Man  2.0 K/mm3 (1.8-7.7)   06/26/19  05:06    


 


Band Neutrophils #  0.0 K/mm3  06/26/19  05:06    


 


  0.9 K/mm3 (1.2-5.4)  L  06/26/19  05:06    


 


Abs React Lymphs (Man)  0.0 K/mm3  06/26/19  05:06    


 


  0.8 K/mm3 (0.0-0.8)   06/26/19  05:06    


 


  0.2 K/mm3 (0.0-0.4)   06/26/19  05:06    


 


  0.0 K/mm3 (0.0-0.1)   06/26/19  05:06    


 


  0.0 K/mm3  06/26/19  05:06    


 


  0.0 K/mm3  06/26/19  05:06    


 


  0.0 K/mm3  06/26/19  05:06    


 


Blast Cells #  0.0 K/mm3  06/26/19  05:06    


 


WBC Morphology  Not Reportable   06/26/19  05:06    


 


Hypersegmented Neuts  Not Reportable   06/26/19  05:06    


 


Hyposegmented Neuts  Not Reportable   06/26/19  05:06    


 


Hypogranular Neuts  Not Reportable   06/26/19  05:06    


 


  Not Reportable   06/26/19  05:06    


 


  Not Reportable   06/26/19  05:06    


 


  Not Reportable   06/26/19  05:06    


 


  Not Reportable   06/26/19  05:06    


 


  Not Reportable   06/26/19  05:06    


 


  Not Reportable   06/26/19  05:06    


 


  Not Reportable   06/26/19  05:06    


 


  Not Reportable   06/26/19  05:06    


 


Plt Clumps, EDTA  Not Reportable   06/26/19  05:06    


 


  Not Reportable   06/26/19  05:06    


 


  Not Reportable   06/26/19  05:06    


 


  Not Reportable   06/26/19  05:06    


 


Plt Morphology Comment  Not Reportable   06/26/19  05:06    


 


RBC Morphology  Normal   06/26/19  05:06    


 


Dimorphic RBCs  Not Reportable   06/26/19  05:06    


 


  Not Reportable   06/26/19  05:06    


 


  Not Reportable   06/26/19  05:06    


 


  Not Reportable   06/26/19  05:06    


 


  Not Reportable   06/26/19  05:06    


 


  Not Reportable   06/26/19  05:06    


 


  Not Reportable   06/26/19  05:06    


 


  Not Reportable   06/26/19  05:06    


 


  Not Reportable   06/26/19  05:06    


 


  Not Reportable   06/26/19  05:06    


 


  Not Reportable   06/26/19  05:06    


 


  Not Reportable   06/26/19  05:06    


 


  Not Reportable   06/26/19  05:06    


 


  Not Reportable   06/26/19  05:06    


 


  Not Reportable   06/26/19  05:06    


 


  Not Reportable   06/26/19  05:06    


 


  Not Reportable   06/26/19  05:06    


 


  Not Reportable   06/26/19  05:06    


 


  Not Reportable   06/26/19  05:06    


 


  Not Reportable   06/26/19  05:06    


 


Acanthocytes (Spur)  Not Reportable   06/26/19  05:06    


 


Rouleaux  Not Reportable   06/26/19  05:06    


 


  Not Reportable   06/26/19  05:06    


 


  Not Reportable   06/26/19  05:06    


 


  Not Reportable   06/26/19  05:06    


 


  Not Reportable   06/26/19  05:06    


 


Hem Pathologist Commnt  No   06/26/19  05:06    


 


Sodium  142 mmol/L (137-145)   06/26/19  05:06    


 


Potassium  4.4 mmol/L (3.6-5.0)   06/26/19  05:06    


 


Chloride  105.6 mmol/L ()   06/26/19  05:06    


 


Carbon Dioxide  23 mmol/L (22-30)   06/26/19  05:06    


 


  18 mmol/L  06/26/19  05:06    


 


BUN  14 mg/dL (9-20)   06/26/19  05:06    


 


  1.3 mg/dL (0.8-1.5)   06/26/19  05:06    


 


Estimated GFR  > 60 ml/min  06/26/19  05:06    


 


  11 %  06/26/19  05:06    


 


Glucose  88 mg/dL ()   06/26/19  05:06    


 


Calcium  8.7 mg/dL (8.4-10.2)   06/26/19  05:06    


 


Magnesium  2.30 mg/dL (1.7-2.3)   06/25/19  19:43    


 


  0.30 mg/dL (0.1-1.2)   06/26/19  05:06    


 


AST  32 units/L (5-40)   06/26/19  05:06    


 


ALT  18 units/L (7-56)   06/26/19  05:06    


 


  106 units/L ()   06/26/19  05:06    


 


  < 0.010 ng/mL (0.00-0.029)   06/25/19  13:04    


 


NT-Pro-B Natriuret Pep  913.6 pg/mL (0-900)  H  06/25/19  19:43    


 


  6.0 g/dL (6.3-8.2)  L  06/26/19  05:06    


 


  3.4 g/dL (3.9-5)  L  06/26/19  05:06    


 


  1.3 %  06/26/19  05:06    


 


TSH  0.950 mlU/mL (0.270-4.200)   06/25/19  19:43    


 


Free T4  1.63 ng/dL (0.76-1.46)  H  06/25/19  19:43    


 


  [place]  (Yellow)   06/25/19  01:30    


 


  Slightly-cloudy  (Clear)   06/25/19  01:30    


 


  5.0  (5.0-7.0)   06/25/19  01:30    


 


Ur Specific Gravity  > 1.030  (1.003-1.030)  H  06/25/19  01:30    


 


  30 mg/dl mg/dL (Negative)   06/25/19  01:30    


 


  Neg mg/dL (Negative)   06/25/19  01:30    


 


  Neg mg/dL (Negative)   06/25/19  01:30    


 


  Sm  (Negative)   06/25/19  01:30    


 


  Neg  (Negative)   06/25/19  01:30    


 


  Neg  (Negative)   06/25/19  01:30    


 


  2.0 mg/dL (<2.0)   06/25/19  01:30    


 


Ur Leukocyte Esterase  Neg  (Negative)   06/25/19  01:30    


 


  2.0 /HPF (0.0-6.0)   06/25/19  01:30    


 


  3.0 /HPF (0.0-6.0)   06/25/19  01:30    


 


U Epithel Cells (Auto)  1.0 /HPF (0-13.0)   06/25/19  01:30    


 


  3+ /HPF  06/25/19  01:30    














Active Medications





- Current Medications


Current Medications: 














Generic Name Dose Route Start Last Admin





  Trade Name Freq  PRN Reason Stop Dose Admin


 


Acetaminophen  650 mg  06/25/19 17:35 





  Tylenol  PO  





  Q4H PRN  





  Pain MILD(1-3)/Fever >100.5/HA  


 


Albuterol  2.5 mg  06/25/19 17:35  06/27/19 09:44





  Proventil  IH   2.5 mg





  Q4HRT PRN   Administration





  Shortness Of Breath  


 


Amiodarone HCl  200 mg  06/27/19 15:00  06/27/19 15:45





  Cordarone  PO   200 mg





  QDAY ALVERTO   Administration


 


Famotidine  20 mg  06/25/19 22:00  06/27/19 09:25





  Pepcid  PO   20 mg





  BID ALVERTO   Administration


 


Furosemide  40 mg  06/27/19 15:00  06/27/19 15:45





  Lasix  PO   40 mg





  QDAY ALVERTO   Administration


 


Losartan Potassium  25 mg  06/27/19 10:00  06/27/19 09:25





  Cozaar  PO   25 mg





  QDAY ALVERTO   Administration


 


Methylprednisolone Sodium Succinate  20 mg  06/26/19 10:00  06/27/19 09:25





  Solu-Medrol  IV   20 mg





  Q12HR ALVERTO   Administration


 


Metoprolol Tartrate  25 mg  06/27/19 22:00 





  Lopressor  PO  





  BID ALVERTO  


 


Morphine Sulfate  1 mg  06/27/19 14:36  06/27/19 14:41





  Morphine  IV   1 mg





  Q6H PRN   Administration





  Pain, Moderate (4-6)  


 


Ondansetron HCl  4 mg  06/25/19 17:35 





  Zofran  IV  





  Q8H PRN  





  Nausea And Vomiting  


 


Oxycodone HCl  5 mg  06/25/19 20:42  06/27/19 02:19





  Roxicodone  PO   5 mg





  Q4H PRN   Administration





  Pain, Moderate (4-6)  


 


Sodium Chloride  10 ml  06/25/19 22:00  06/27/19 09:26





  Sodium Chloride Flush Syringe 10 Ml  IV   10 ml





  BID ALVERTO   Administration


 


Sodium Chloride  10 ml  06/25/19 17:35 





  Sodium Chloride Flush Syringe 10 Ml  IV  





  PRN PRN  





  LINE FLUSH

## 2019-06-28 PROCEDURE — 4A033R1 MEASUREMENT OF ARTERIAL SATURATION, PERIPHERAL, PERCUTANEOUS APPROACH: ICD-10-PCS | Performed by: INTERNAL MEDICINE

## 2019-06-28 RX ADMIN — LOSARTAN POTASSIUM SCH: 25 TABLET, FILM COATED ORAL at 10:34

## 2019-06-28 RX ADMIN — METOPROLOL TARTRATE SCH: 25 TABLET, FILM COATED ORAL at 10:35

## 2019-06-28 RX ADMIN — MORPHINE SULFATE PRN MG: 2 INJECTION, SOLUTION INTRAMUSCULAR; INTRAVENOUS at 10:36

## 2019-06-28 RX ADMIN — METOPROLOL TARTRATE SCH MG: 25 TABLET, FILM COATED ORAL at 21:53

## 2019-06-28 RX ADMIN — Medication SCH ML: at 10:36

## 2019-06-28 RX ADMIN — ALBUTEROL SULFATE PRN MG: 2.5 SOLUTION RESPIRATORY (INHALATION) at 12:18

## 2019-06-28 RX ADMIN — FAMOTIDINE SCH MG: 20 TABLET ORAL at 21:53

## 2019-06-28 RX ADMIN — FUROSEMIDE SCH MG: 40 TABLET ORAL at 10:35

## 2019-06-28 RX ADMIN — AMIODARONE HYDROCHLORIDE SCH MG: 200 TABLET ORAL at 10:34

## 2019-06-28 RX ADMIN — Medication SCH ML: at 21:53

## 2019-06-28 RX ADMIN — ALBUTEROL SULFATE PRN MG: 2.5 SOLUTION RESPIRATORY (INHALATION) at 21:25

## 2019-06-28 RX ADMIN — FAMOTIDINE SCH MG: 20 TABLET ORAL at 10:35

## 2019-06-28 RX ADMIN — ALBUTEROL SULFATE PRN MG: 2.5 SOLUTION RESPIRATORY (INHALATION) at 05:15

## 2019-06-28 RX ADMIN — MORPHINE SULFATE PRN MG: 2 INJECTION, SOLUTION INTRAMUSCULAR; INTRAVENOUS at 01:46

## 2019-06-28 NOTE — DISCHARGE SUMMARY
Providers





- Providers


Date of Admission: 


06/25/19 17:35





Attending physician: 


ANDREI HERNANDEZ MD





                                        





06/26/19 11:51


Consult to Physician [CONS] Routine 


   Comment: 


   Consulting Provider: HAILEE BETTENCOURT


   Physician Instructions: 


   Reason For Exam: copd exacebation





06/27/19 16:51


Consult to Physician [CONS] Routine 


   Comment: 


   Consulting Provider: JAREK NAVARRO


   Physician Instructions: 


   Reason For Exam: abdominal pain





06/27/19 17:05


Consult to Physician [CONS] Routine 


   Comment: 


   Consulting Provider: NIYA CROW


   Physician Instructions: 


   Reason For Exam: avascular necrosis, ?pathologic Rib fracture











Primary care physician: 


MARCO ANTONIO IZAGUIRRE








Hospitalization


Reason for admission: chf


Condition: Stable


Hospital course: 


53 YO Male with COPD, CHF, Obesity Hypoventilation presents to ED for 

evaluation. Pt states that he has experienced pain to his left flank over the 

past 3 days with worsening symptoms over the past 2 days. Pt reports coughing 

episodes followed by pain to the left flank. Pt also reports diaphragmatic 

injury 3 months ago and that the was informed that he may need surgery. Pt 

acknowledges shortness of breath, and nonproductive cough, increased nebulizer 

therapy without relief. Pt also reports dypsnea on exertion, dypsnea at rest, 

decreased exercise tolerance, Orthopnea/PND, as well as leg edema. Pt denies 

fever, chills, CP, Palpitations, NVD, Trauma, BRBPR, Prolonged 

travel/immobility, Unilateral leg swelling, calf pain, or recent ill contacts. 

Pt denies compliance with cardiac diet as well as medication. Pt unable to 

recall his prescribed medication. Pt transported to [facility] via private vehicle. Pt 

seen and evaluated in ED and found to have symptoms consistent with CHF 

Decompensation, as well as COPD Exacerbation. Pt admitted to telemetry. CT Chest

pending at time of admission. No prior admission for review. No medication 

listed for reconciliation at time of admission. 








ADVISED BY Hemonc about possible steroid induced osteonecrosis. Patient will 

follow with Surgery, heme, RHEUMATOLOGY, CARDIOLOGY,  pulmonary and pcp 

outpatient


also vitamin D with calcium ordered


WEIGHTLOSS STRONGLY RECOMMENDED








- Patient Problems


(1) CHF (congestive heart failure)


Current Visit: Yes   Status: Acute   


Qualifiers: 


   Heart failure chronicity: acute on chronic 


Plan to address problem: 


Continue current management, await cardiology consult, Echo, strict I/O, daily 

weight, bnp, thyroid panel, magnesium level, chest x ray, monitor uop q shift, 

monitor blood pressure q shift, afterload reduction. pulse oximetry








(2) Obesity hypoventilation syndrome


Current Visit: Yes   Status: Acute   


Plan to address problem: 


supplemental oxygen, nebulizer therapy, NIPPV as clinically indicated.








(3) Acute exacerbation of chronic obstructive pulmonary disease (COPD)


Current Visit: Yes   Status: Acute   


Plan to address problem: 


supplemental oxygen, nebulizer therapy, steroid therapy, 








(4) Diaphragm injury-POA


Current Visit: Yes   Status: Chronic   


Qualifiers: 


   Encounter type: initial encounter   Qualified Code(s): S27.809A - Unspecified

injury of diaphragm, initial encounter   


Plan to address problem: 


CT abdomen pelvis showed hernia without incarceration, supportive care, 








(5) hx of RIB fracture-left and also non union formation-poa


Continue current supportive care treatment


CT Surgeon on discharge





(6)Right femoral head avascular necrosis-POA


?Etiology. Hematology eval





PLERUTIC CHEST PAIN


secondary to rib fracture





Disposition: DC-01 TO HOME OR SELFCARE


Time spent for discharge: 35 MINS





Core Measure Documentation





- Palliative Care


Palliative Care/ Comfort Measures: Not Applicable





- Core Measures


Any of the following diagnoses?: heart failure, none





- Heart Failure Discharge Requirements


ACE/ARB for LVSD if EF <40%: Yes


Beta blocker at discharge: Yes





Exam





- Physical Exam


Narrative exam: 


- EENT


Eyes: Present: PERRL


ENT: hearing intact, clear oral mucosa





- Neck


Neck: Present: supple, normal ROM





- Respiratory


Respiratory effort: normal


Respiratory: bilateral: diminished, rhonchi





- Cardiovascular


Heart Sounds: Present: S1 & S2.  Absent: rub, click





- Extremities


Extremities: pulses symmetrical


Extremity abnormal: edema


Peripheral Pulses: within normal limits





- Abdominal


General gastrointestinal: Present: soft, noted tender today, LLQ non-distended, 

normal bowel sounds


Male genitourinary: Present: normal





- Integumentary


Integumentary: Present: clear, warm, dry





- Musculoskeletal


Musculoskeletal: gait normal, strength equal bilaterally





- Psychiatric


Psychiatric: appropriate mood/affect, intact judgment & insight





- Neurologic


Neurologic: CNII-XII intact, moves all extremities





- Additional findings


Additional findings: 





Left chest wall tenderness with palpation in area of the left flank at site of 

previous rib fractures. 








- Constitutional


Vitals: 


                                        











Temp Pulse Resp BP Pulse Ox


 


 97.7 F   73   20   129/86   95 


 


 06/28/19 08:10  06/28/19 10:35  06/28/19 05:25  06/28/19 10:35  06/28/19 08:17














Plan


Activity: advance as tolerated, fall precautions


Diet: low fat


Special Instructions: record daily weights, record daily BP diary


Additional Instructions: follow with ct surgeon


Follow up with: 


MARCO ANTONIO IZAGUIRRE MD [Primary Care Provider] - 7 Days


HAILEE BETTENCOURT MD [Staff Physician] - 7 Days


ELISE NORTON MD [Staff Physician] - 7 Days


EMILIANO VELA DO [Staff Physician] - 7 Days


NIYA CROW MD [Staff Physician] - 7 Days


SHARMA,PALLAVI, MD [Referring] - 7 Days


Prescriptions: 


Albuterol 0.63% NEBS 2.5 mg INHALATION Q4H PRN #1


 PRN Reason: Shortness Of Breath


Amiodarone 200 mg PO QDAY #30


Calcium Carbonate/Vitamin D3 [Calcium 500 + Vit D3 400 Tab] 1 each PO BID #60 

tablet


Losartan [Cozaar] 25 mg PO QDAY #30 tablet


Cymbalta 60 mg PO QDAY #30


Dulera 200 Mcg/5 Mcg Inhaler 200 mcg INHALATION BID #1


Lasix TAB 40 mg PO QDAY #30


Lopressor TAB 25 mg PO BID #60


Famotidine [Pepcid] 20 mg PO BID #60 tablet


ProAir HFA Inhaler 2 puff IH Q4HR PRN #1


 PRN Reason: Shortness Of Breath


Xarelto 10 mg PO QDAY #30

## 2019-06-28 NOTE — PROGRESS NOTE
Assessment and Plan








Acute exacerbation of chronic obstructive pulmonary disease (COPD)


CHF (congestive heart failure)


Obesity hypoventilation syndrome


Diaphragm injury


hx of RIB fracture


DVT prophylaxis





- continue bronchodilators with pulmonary hygiene per RT


- contibnue systemic steroids with slow taper


- continue empiric AB's


- supplemental oxygen as needed to keep O2 sats > 90%


- prn analgesia; rib fractures appear old and healing; ? steroid complication


- GI & VTE prophylaxis


- continue other care per attending / other consultants





... re-evaluate in am & prn











Subjective


Date of service: 06/28/19


Principal diagnosis: Ac. exacerbation of COPD; CHF; OHS; Diaphragm injury; hx of

RIB fracture


Interval history: 





Patient is seen today for: Acute exacerbation of chronic obstructive pulmonary 

disease (COPD); CHF (congestive heart failure); Obesity hypoventilation 

syndrome; Diaphragm injury; hx of RIB fracture





Seen and examined at bedside; 24hour events reviewed; nursing and respiratory 

care staff consulted; no adverse overnight events reported to me; resting 

peacefully in bed; less SOB; No N/V/F/C





Objective


                               Vital Signs - 12hr











  06/28/19 06/28/19 06/28/19





  05:15 05:25 08:10


 


Temperature   97.7 F


 


Pulse Rate   73


 


Pulse Rate [ 73 75 





Anterior   





Bilateral   





Throughout]   


 


Respiratory 20 20 





Rate [Anterior   





Bilateral   





Throughout]   


 


Blood Pressure   129/86


 


O2 Sat by Pulse   94





Oximetry   














  06/28/19 06/28/19 06/28/19





  08:17 10:00 10:34


 


Temperature   


 


Pulse Rate  88 73


 


Pulse Rate [   





Anterior   





Bilateral   





Throughout]   


 


Respiratory   





Rate [Anterior   





Bilateral   





Throughout]   


 


Blood Pressure   129/86


 


O2 Sat by Pulse 95  





Oximetry   














  06/28/19 06/28/19 06/28/19





  10:35 12:18 12:19


 


Temperature   


 


Pulse Rate 73  


 


Pulse Rate [  77 78





Anterior   





Bilateral   





Throughout]   


 


Respiratory  18 19





Rate [Anterior   





Bilateral   





Throughout]   


 


Blood Pressure 129/86  


 


O2 Sat by Pulse   





Oximetry   











Constitutional: appears uncomfortable, other (middle aged obese AAM with mildly 

increased resp effort at rest)


Eyes: non-icteric


ENT: oropharynx moist, other (mallampati 3)


Neck: supple, no lymphadenopathy, no JVD, other (large neck circumference)


Effort: mildly labored


Ascultation: Bilateral: diminished breath sounds, rhonchi (scant in bases)


Percussion: Bilateral: not dull


Cardiovascular: regular rate and rhythm


Gastrointestinal: normoactive bowel sounds, soft, non-tender, non-distended


Integumentary: normal


Extremities: no cyanosis, no edema, pulses normal, no ischemia or petechiae


Neurologic: normal mental status, non-focal exam, pupils equal and round, motor 

strength normal and


Psychiatric: anxious


CBC and BMP: 


                                 06/26/19 05:06





                                 06/26/19 05:06


ABG, PT/INR, D-dimer: 


ABG











POC ABG pH  7.418  (7.35-7.45)   06/28/19  09:37    


 


POC ABG pCO2  40.2  (35-45)   06/28/19  09:37    


 


POC ABG pO2  79  ()  L  06/28/19  09:37    


 


POC ABG HCO3  26.0  (22-26 mml/L)   06/28/19  09:37    


 


POC ABG Total CO2  27  (23-27mmol/L)   06/28/19  09:37    


 


POC ABG O2 Sat  96   06/28/19  09:37    








Abnormal lab findings: 


                                  Abnormal Labs











  06/25/19 06/25/19 06/25/19





  01:30 13:04 13:04


 


WBC   


 


RBC   5.16 H 


 


MCV   


 


MCH   


 


RDW   17.0 H 


 


Monocytes % (Manual)   12.0 H 


 


Lymphocytes # (Manual)   1.0 L 


 


POC ABG pO2   


 


POC Glucose   


 


NT-Pro-B Natriuret Pep   


 


Total Protein   


 


Albumin    3.8 L


 


Beta-2-Microglobulin   


 


Free T4   


 


Ur Specific Gravity  > 1.030 H  














  06/25/19 06/25/19 06/25/19





  19:43 19:43 19:43


 


WBC   


 


RBC   


 


MCV   


 


MCH   


 


RDW   


 


Monocytes % (Manual)   


 


Lymphocytes # (Manual)   


 


POC ABG pO2   


 


POC Glucose   


 


NT-Pro-B Natriuret Pep  913.6 H  


 


Total Protein   


 


Albumin   


 


Beta-2-Microglobulin    4.41 H


 


Free T4   1.63 H 


 


Ur Specific Gravity   














  06/26/19 06/26/19 06/27/19





  05:06 05:06 20:52


 


WBC  3.9 L  


 


RBC   


 


MCV  83 L  


 


MCH  27 L  


 


RDW  16.3 H  


 


Monocytes % (Manual)  20.0 H  


 


Lymphocytes # (Manual)  0.9 L  


 


POC ABG pO2   


 


POC Glucose    135 H


 


NT-Pro-B Natriuret Pep   


 


Total Protein   6.0 L 


 


Albumin   3.4 L 


 


Beta-2-Microglobulin   


 


Free T4   


 


Ur Specific Gravity   














  06/28/19





  09:37


 


WBC 


 


RBC 


 


MCV 


 


MCH 


 


RDW 


 


Monocytes % (Manual) 


 


Lymphocytes # (Manual) 


 


POC ABG pO2  79 L


 


POC Glucose 


 


NT-Pro-B Natriuret Pep 


 


Total Protein 


 


Albumin 


 


Beta-2-Microglobulin 


 


Free T4 


 


Ur Specific Gravity 











Allied health notes reviewed: nursing

## 2019-06-28 NOTE — CONSULTATION
History of Present Illness


Consult date: 06/28/19


Reason for consult: other (diaphragmatic hernia)


Chief complaint: 





abdominal pain





- History of present illness


History of present illness: 





54 year old male who presented to ED with abdominal / left flank pain and COPD 

exacerbation. 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 

after a severe coughing episode. He was evaluated for it in [place] prior to moving

to [place]. He was told he should get it repaired. He currently complains of 

mild to moderate lower abdominal and left flank pain. He is able to tolerate a 

diet and is being worked up by pulmonary and cardiology. 





Past History


Past Medical History: COPD, heart failure, other (Obesity Hypoventilation, 

Diaphragmatic injury,)


Past Surgical History: Other (Embelectomy)


Social history: single, smoking.  denies: alcohol abuse, prescription drug abuse


Family history: diabetes, hypertension





Medications and Allergies


                                    Allergies











Allergy/AdvReac Type Severity Reaction Status Date / Time


 


doxycycline Allergy  Hives Verified 06/25/19 12:40


 


lisinopril Allergy  Anaphylaxis Verified 06/25/19 12:39


 


spironolactone Allergy  Hives Verified 06/25/19 12:40











                                Home Medications











 Medication  Instructions  Recorded  Confirmed  Last Taken  Type


 


Albuterol 0.63% NEBS 2.5 mg INHALATION Q4H PRN #1 06/28/19  Unknown Rx


 


Amiodarone 200 mg PO QDAY #30 06/28/19  Unknown Rx


 


Calcium Carbonate/Vitamin D3 1 each PO BID #60 tablet 06/28/19  Unknown Rx





[Calcium 500 + Vit D3 400 Tab]     


 


Cymbalta 60 mg PO QDAY #30 06/28/19  Unknown Rx


 


Dulera 200 Mcg/5 Mcg Inhaler 200 mcg INHALATION BID #1 06/28/19  Unknown Rx


 


Famotidine [Pepcid] 20 mg PO BID #60 tablet 06/28/19  Unknown Rx


 


Lasix TAB 40 mg PO QDAY #30 06/28/19  Unknown Rx


 


Lopressor TAB 25 mg PO BID #60 06/28/19  Unknown Rx


 


Losartan [Cozaar] 25 mg PO QDAY #30 tablet 06/28/19  Unknown Rx


 


ProAir HFA Inhaler 2 puff IH Q4HR PRN #1 06/28/19  Unknown Rx


 


Xarelto 10 mg PO QDAY #30 06/28/19  Unknown Rx











Active Meds: 


Active Medications





Acetaminophen (Tylenol)  650 mg PO Q4H PRN


   PRN Reason: Pain MILD(1-3)/Fever >100.5/HA


Albuterol (Proventil)  2.5 mg IH Q4HRT PRN


   PRN Reason: Shortness Of Breath


   Last Admin: 06/28/19 12:18 Dose:  2.5 mg


   Documented by: 


Amiodarone HCl (Cordarone)  200 mg PO QDAY [facility]


   Last Admin: 06/28/19 10:34 Dose:  200 mg


   Documented by: 


Famotidine (Pepcid)  20 mg PO BID [facility]


   Last Admin: 06/28/19 10:35 Dose:  20 mg


   Documented by: 


Furosemide (Lasix)  40 mg PO QDAY [facility]


   Last Admin: 06/28/19 10:35 Dose:  40 mg


   Documented by: 


Losartan Potassium (Cozaar)  25 mg PO QDAY [facility]


   Last Admin: 06/28/19 10:34 Dose:  Not Given


   Documented by: 


Methylprednisolone Sodium Succinate (Solu-Medrol)  20 mg IV Q12HR [facility]


   Last Admin: 06/28/19 10:36 Dose:  20 mg


   Documented by: 


Metoprolol Tartrate (Lopressor)  25 mg PO BID [facility]


   Last Admin: 06/28/19 10:35 Dose:  Not Given


   Documented by: 


Morphine Sulfate (Morphine)  1 mg IV Q6H PRN


   PRN Reason: Pain, Moderate (4-6)


   Last Admin: 06/28/19 10:36 Dose:  1 mg


   Documented by: 


Ondansetron HCl (Zofran)  4 mg IV Q8H PRN


   PRN Reason: Nausea And Vomiting


Oxycodone HCl (Roxicodone)  5 mg PO Q4H PRN


   PRN Reason: Pain, Moderate (4-6)


   Last Admin: 06/27/19 02:19 Dose:  5 mg


   Documented by: 


Sodium Chloride (Sodium Chloride Flush Syringe 10 Ml)  10 ml IV BID [facility]


   Last Admin: 06/28/19 10:36 Dose:  10 ml


   Documented by: 


Sodium Chloride (Sodium Chloride Flush Syringe 10 Ml)  10 ml IV PRN PRN


   PRN Reason: LINE FLUSH











Review of Systems





- Respiratory


cough





- Gastrointestinal


abdominal pain, no nausea, no vomiting





Exam


                                   Vital Signs











Temp Pulse Resp BP Pulse Ox


 


 98 F   87   18   129/89   93 


 


 06/25/19 18:29  06/25/19 18:29  06/25/19 18:29  06/25/19 18:29  06/25/19 18:29














- General physical appearance


Positive: well developed, well nourished, no distress, obese





- Respiratory


Positive: normal expansion, normal respiratory effort





- Extremities


Extremities: no ischemia





- Abdomen


Abdomen: Present: soft, other (obese, tender to deep palpation lower abdomen)





Results





- Labs





                                 06/26/19 05:06





                                 06/26/19 05:06


                              Abnormal lab results











  06/25/19 06/27/19 06/28/19 Range/Units





  19:43 20:52 09:37 


 


POC ABG pO2    79 L  ()  


 


POC Glucose   135 H   ()  


 


Beta-2-Microglobulin  4.41 H    (<=2.51)  mg/L














- Imaging


CT scan - abdomen: report reviewed, image reviewed


CT scan - chest: report reviewed (left sided diaphragmatic hernia abdominal fat 

herniating into the thoracic cavity), image reviewed





Assessment and Plan





A: Diaphragmatic hernia, stable, non obstructing.


   follow up with Dr. Starr / Dr. Landon [phone] for outpatient evaluation

 and treatment plan once COPD and CHF controlled and optimized. Will take some 

planning and coordination with his teams as he is also on xarelto for a hx of 

blood clots. 





COPD: treatment per primary

## 2019-06-29 VITALS — SYSTOLIC BLOOD PRESSURE: 126 MMHG | DIASTOLIC BLOOD PRESSURE: 86 MMHG

## 2019-06-29 LAB
ALBUMIN SERPL-MCNC: 3.4 G/DL (ref 3.8–4.8)
GAMMA GLOB SERPL ELPH-MCNC: 0.9 G/DL (ref 0.8–1.7)

## 2019-06-29 RX ADMIN — ALBUTEROL SULFATE PRN MG: 2.5 SOLUTION RESPIRATORY (INHALATION) at 06:26

## 2019-06-29 RX ADMIN — MORPHINE SULFATE PRN MG: 2 INJECTION, SOLUTION INTRAMUSCULAR; INTRAVENOUS at 04:12

## 2019-06-29 NOTE — PROGRESS NOTE
Assessment and Plan


Assessment and plan: 


55 YO Male with COPD, CHF, Obesity Hypoventilation presents to ED for 

evaluation. Pt states that he has experienced pain to his left flank over the 

past 3 days with worsening symptoms over the past 2 days. Pt reports coughing 

episodes followed by pain to the left flank. Pt also reports diaphragmatic inju

ry 3 months ago and that the was informed that he may need surgery. Pt 

acknowledges shortness of breath, and nonproductive cough, increased nebulizer 

therapy without relief. Pt also reports dypsnea on exertion, dypsnea at rest, 

decreased exercise tolerance, Orthopnea/PND, as well as leg edema. Pt denies 

fever, chills, CP, Palpitations, NVD, Trauma, BRBPR, Prolonged 

travel/immobility, Unilateral leg swelling, calf pain, or recent ill contacts. 

Pt denies compliance with cardiac diet as well as medication. Pt unable to 

recall his prescribed medication. Pt transported to [facility] via private vehicle. Pt 

seen and evaluated in ED and found to have symptoms consistent with CHF 

Decompensation, as well as COPD Exacerbation. Pt admitted to telemetry. CT Chest

pending at time of admission. No prior admission for review. No medication 

listed for reconciliation at time of admission. 





- Patient Problems


(1) CHF (congestive heart failure)


Current Visit: Yes   Status: Acute   


Qualifiers: 


   Heart failure chronicity: acute on chronic 


Plan to address problem: 


Continue current management, strict I/O, daily weight, bnp, thyroid panel, 

magnesium level, chest x ray, monitor uop q shift, monitor blood pressure q 

shift, afterload reduction. pulse oximetry


awaiting discharge








(2) Obesity hypoventilation syndrome


Current Visit: Yes   Status: Acute   


Plan to address problem: 


supplemental oxygen, nebulizer therapy, NIPPV as clinically indicated.








(3) Acute exacerbation of chronic obstructive pulmonary disease (COPD)


Current Visit: Yes   Status: Acute   


Plan to address problem: 


supplemental oxygen, nebulizer therapy, steroid therapy, 


PULMONARY CONSULT noted








(4) Diaphragm injury-POA


Current Visit: Yes   Status: Chronic   


Qualifiers: 


   Encounter type: initial encounter   Qualified Code(s): S27.809A - Unspecified

injury of diaphragm, initial encounter   


Plan to address problem: 


CT abdomen pelvis, no obvious pathology except for noted prior hernia with no 

incarnation, supportive care, 








(5) hx of RIB fracture-left and also non union formation-poa


Continue current supportive care treatment


CT Surgeon on discharge





(6)Right femoral head avascular necrosis-POA


?Etiology. Hematology eval





PLERUTIC CHEST PAIN


secondary to rib fracture





DVT prophylaxis


Current Visit: Yes   Status: Acute   


Plan to address problem: 


SCD to BLE while in bed, hold anticoagulation until clarification of nature of 

diaphragm injury.











History


Interval history: 


Patient seen and examined this morning, improved respiration,  chronic per the 

patient but intermittent.








Hospitalist Physical





- Physical exam


Narrative exam: 


- EENT


Eyes: Present: PERRL


ENT: hearing intact, clear oral mucosa





- Neck


Neck: Present: supple, normal ROM





- Respiratory


Respiratory effort: normal


Respiratory: bilateral: diminished, rhonchi





- Cardiovascular


Heart Sounds: Present: S1 & S2.  Absent: rub, click





- Extremities


Extremities: pulses symmetrical


Extremity abnormal: edema


Peripheral Pulses: within normal limits





- Abdominal


General gastrointestinal: Present: soft, noted tender today, LLQ non-distended, 

normal bowel sounds


Male genitourinary: Present: normal





- Integumentary


Integumentary: Present: clear, warm, dry





- Musculoskeletal


Musculoskeletal: gait normal, strength equal bilaterally





- Psychiatric


Psychiatric: appropriate mood/affect, intact judgment & insight





- Neurologic


Neurologic: CNII-XII intact, moves all extremities





- Additional findings


Additional findings: 





Left chest wall tenderness with palpation in area of the left flank at site of 

previous rib fractures. 








- Constitutional


Vitals: 


                                        











Temp Pulse Resp BP Pulse Ox


 


 98.0 F   68   15   104/69   92 


 


 06/28/19 23:42  06/29/19 06:38  06/29/19 06:38  06/28/19 23:42  06/28/19 23:42











General appearance: Present: no acute distress





Results





- Labs


CBC & Chem 7: 


                                 06/26/19 05:06





                                 06/26/19 05:06


Labs: 


                             Laboratory Last Values











WBC  3.9 K/mm3 (4.5-11.0)  L  06/26/19  05:06    


 


RBC  4.65 M/mm3 (3.65-5.03)   06/26/19  05:06    


 


Hgb  12.7 gm/dl (11.8-15.2)   06/26/19  05:06    


 


Hct  38.6 % (35.5-45.6)   06/26/19  05:06    


 


MCV  83 fl (84-94)  L  06/26/19  05:06    


 


MCH  27 pg (28-32)  L  06/26/19  05:06    


 


MCHC  33 % (32-34)   06/26/19  05:06    


 


RDW  16.3 % (13.2-15.2)  H  06/26/19  05:06    


 


Plt Count  183 K/mm3 (140-440)   06/26/19  05:06    


 


Mono % (Auto)  Np   06/26/19  05:06    


 


Add Manual Diff  Complete   06/26/19  05:06    


 


Total Counted  100   06/26/19  05:06    


 


Seg Neuts % (Manual)  52.0 % (40.0-70.0)   06/26/19  05:06    


 


  0 %  06/26/19  05:06    


 


  23.0 % (13.4-35.0)   06/26/19  05:06    


 


Reactive Lymphs % (Man)  0 %  06/26/19  05:06    


 


  20.0 % (0.0-7.3)  H  06/26/19  05:06    


 


  4.0 % (0.0-4.3)   06/26/19  05:06    


 


  1.0 % (0.0-1.8)   06/26/19  05:06    


 


  0 %  06/26/19  05:06    


 


  0 %  06/26/19  05:06    


 


  0 %  06/26/19  05:06    


 


  0 %  06/26/19  05:06    


 


Nucleated RBC %  Not Reportable   06/26/19  05:06    


 


Seg Neutrophils # Man  2.0 K/mm3 (1.8-7.7)   06/26/19  05:06    


 


Band Neutrophils #  0.0 K/mm3  06/26/19  05:06    


 


  0.9 K/mm3 (1.2-5.4)  L  06/26/19  05:06    


 


Abs React Lymphs (Man)  0.0 K/mm3  06/26/19  05:06    


 


  0.8 K/mm3 (0.0-0.8)   06/26/19  05:06    


 


  0.2 K/mm3 (0.0-0.4)   06/26/19  05:06    


 


  0.0 K/mm3 (0.0-0.1)   06/26/19  05:06    


 


  0.0 K/mm3  06/26/19  05:06    


 


  0.0 K/mm3  06/26/19  05:06    


 


  0.0 K/mm3  06/26/19  05:06    


 


Blast Cells #  0.0 K/mm3  06/26/19  05:06    


 


WBC Morphology  Not Reportable   06/26/19  05:06    


 


Hypersegmented Neuts  Not Reportable   06/26/19  05:06    


 


Hyposegmented Neuts  Not Reportable   06/26/19  05:06    


 


Hypogranular Neuts  Not Reportable   06/26/19  05:06    


 


  Not Reportable   06/26/19  05:06    


 


  Not Reportable   06/26/19  05:06    


 


  Not Reportable   06/26/19  05:06    


 


  Not Reportable   06/26/19  05:06    


 


  Not Reportable   06/26/19  05:06    


 


  Not Reportable   06/26/19  05:06    


 


  Not Reportable   06/26/19  05:06    


 


  Not Reportable   06/26/19  05:06    


 


Plt Clumps, EDTA  Not Reportable   06/26/19  05:06    


 


  Not Reportable   06/26/19  05:06    


 


  Not Reportable   06/26/19  05:06    


 


  Not Reportable   06/26/19  05:06    


 


Plt Morphology Comment  Not Reportable   06/26/19  05:06    


 


RBC Morphology  Normal   06/26/19  05:06    


 


Dimorphic RBCs  Not Reportable   06/26/19  05:06    


 


  Not Reportable   06/26/19  05:06    


 


  Not Reportable   06/26/19  05:06    


 


  Not Reportable   06/26/19  05:06    


 


  Not Reportable   06/26/19  05:06    


 


  Not Reportable   06/26/19  05:06    


 


  Not Reportable   06/26/19  05:06    


 


  Not Reportable   06/26/19  05:06    


 


  Not Reportable   06/26/19  05:06    


 


  Not Reportable   06/26/19  05:06    


 


  Not Reportable   06/26/19  05:06    


 


  Not Reportable   06/26/19  05:06    


 


  Not Reportable   06/26/19  05:06    


 


  Not Reportable   06/26/19  05:06    


 


  Not Reportable   06/26/19  05:06    


 


  Not Reportable   06/26/19  05:06    


 


  Not Reportable   06/26/19  05:06    


 


  Not Reportable   06/26/19  05:06    


 


  Not Reportable   06/26/19  05:06    


 


  Not Reportable   06/26/19  05:06    


 


Acanthocytes (Spur)  Not Reportable   06/26/19  05:06    


 


Rouleaux  Not Reportable   06/26/19  05:06    


 


  Not Reportable   06/26/19  05:06    


 


  Not Reportable   06/26/19  05:06    


 


  Not Reportable   06/26/19  05:06    


 


  Not Reportable   06/26/19  05:06    


 


Hem Pathologist Commnt  No   06/26/19  05:06    


 


POC ABG pH  7.418  (7.35-7.45)   06/28/19  09:37    


 


POC ABG pCO2  40.2  (35-45)   06/28/19  09:37    


 


POC ABG pO2  79  ()  L  06/28/19  09:37    


 


POC ABG HCO3  26.0  (22-26 mml/L)   06/28/19  09:37    


 


POC ABG Total CO2  27  (23-27mmol/L)   06/28/19  09:37    


 


POC ABG O2 Sat  96   06/28/19  09:37    


 


POC ABG Base Excess  1  ((-2) - (+3)mmol/L)   06/28/19  09:37    


 


  21 %  06/28/19  09:37    


 


Sodium  142 mmol/L (137-145)   06/26/19  05:06    


 


Potassium  4.4 mmol/L (3.6-5.0)   06/26/19  05:06    


 


Chloride  105.6 mmol/L ()   06/26/19  05:06    


 


Carbon Dioxide  23 mmol/L (22-30)   06/26/19  05:06    


 


  18 mmol/L  06/26/19  05:06    


 


BUN  14 mg/dL (9-20)   06/26/19  05:06    


 


  1.3 mg/dL (0.8-1.5)   06/26/19  05:06    


 


Estimated GFR  > 60 ml/min  06/26/19  05:06    


 


  11 %  06/26/19  05:06    


 


Glucose  88 mg/dL ()   06/26/19  05:06    


 


POC Glucose  135  ()  H  06/27/19  20:52    


 


Calcium  8.7 mg/dL (8.4-10.2)   06/26/19  05:06    


 


Magnesium  2.30 mg/dL (1.7-2.3)   06/25/19  19:43    


 


  0.30 mg/dL (0.1-1.2)   06/26/19  05:06    


 


AST  32 units/L (5-40)   06/26/19  05:06    


 


ALT  18 units/L (7-56)   06/26/19  05:06    


 


  106 units/L ()   06/26/19  05:06    


 


  < 0.010 ng/mL (0.00-0.029)   06/25/19  13:04    


 


NT-Pro-B Natriuret Pep  913.6 pg/mL (0-900)  H  06/25/19  19:43    


 


  6.4 g/dL (6.1-8.1)   06/25/19  19:43    


 


  6.0 g/dL (6.3-8.2)  L  06/26/19  05:06    


 


  3.4 g/dL (3.9-5)  L  06/26/19  05:06    


 


  1.3 %  06/26/19  05:06    


 


  0.4 g/dL (0.2-0.3)  H  06/25/19  19:43    


 


  1.0 g/dL (0.5-0.9)  H  06/25/19  19:43    


 


  0.4 g/dL (0.2-0.5)   06/25/19  19:43    


 


  4.41 mg/L (<=2.51)  H  06/25/19  19:43    


 


  0.9 g/dL (0.8-1.7)   06/25/19  19:43    


 


Abnorm Protein Band 1  see below   06/25/19  19:43    


 


PEP Interpretation  see below  H  06/25/19  19:43    


 


TSH  0.950 mlU/mL (0.270-4.200)   06/25/19  19:43    


 


Free T4  1.63 ng/dL (0.76-1.46)  H  06/25/19  19:43    


 


  [place]  (Yellow)   06/25/19  01:30    


 


  Slightly-cloudy  (Clear)   06/25/19  01:30    


 


  5.0  (5.0-7.0)   06/25/19  01:30    


 


Ur Specific Gravity  > 1.030  (1.003-1.030)  H  06/25/19  01:30    


 


  30 mg/dl mg/dL (Negative)   06/25/19  01:30    


 


  Neg mg/dL (Negative)   06/25/19  01:30    


 


  Neg mg/dL (Negative)   06/25/19  01:30    


 


  Sm  (Negative)   06/25/19  01:30    


 


  Neg  (Negative)   06/25/19  01:30    


 


  Neg  (Negative)   06/25/19  01:30    


 


  2.0 mg/dL (<2.0)   06/25/19  01:30    


 


Ur Leukocyte Esterase  Neg  (Negative)   06/25/19  01:30    


 


  2.0 /HPF (0.0-6.0)   06/25/19  01:30    


 


  3.0 /HPF (0.0-6.0)   06/25/19  01:30    


 


U Epithel Cells (Auto)  1.0 /HPF (0-13.0)   06/25/19  01:30    


 


  3+ /HPF  06/25/19  01:30    


 


Immunofix Electrophor  see below   06/25/19  19:43    














Active Medications





- Current Medications


Current Medications: 














Generic Name Dose Route Start Last Admin





  Trade Name Freq  PRN Reason Stop Dose Admin


 


Acetaminophen  650 mg  06/25/19 17:35 





  Tylenol  PO  





  Q4H PRN  





  Pain MILD(1-3)/Fever >100.5/HA  


 


Albuterol  2.5 mg  06/25/19 17:35  06/29/19 06:26





  Proventil  IH   2.5 mg





  Q4HRT PRN   Administration





  Shortness Of Breath  


 


Amiodarone HCl  200 mg  06/27/19 15:00  06/28/19 10:34





  Cordarone  PO   200 mg





  QDAY ALVERTO   Administration


 


Famotidine  20 mg  06/25/19 22:00  06/28/19 21:53





  Pepcid  PO   20 mg





  BID ALVERTO   Administration


 


Furosemide  40 mg  06/27/19 15:00  06/28/19 10:35





  Lasix  PO   40 mg





  QDAY ALVERTO   Administration


 


Losartan Potassium  25 mg  06/27/19 10:00  06/28/19 10:34





  Cozaar  PO   Not Given





  QDAY ALVERTO  


 


Methylprednisolone Sodium Succinate  20 mg  06/26/19 10:00  06/28/19 21:53





  Solu-Medrol  IV   20 mg





  Q12HR ALVERTO   Administration


 


Metoprolol Tartrate  25 mg  06/27/19 22:00  06/28/19 21:53





  Lopressor  PO   25 mg





  BID ALVERTO   Administration


 


Morphine Sulfate  1 mg  06/27/19 14:36  06/29/19 04:12





  Morphine  IV   1 mg





  Q6H PRN   Administration





  Pain, Moderate (4-6)  


 


Ondansetron HCl  4 mg  06/25/19 17:35 





  Zofran  IV  





  Q8H PRN  





  Nausea And Vomiting  


 


Oxycodone HCl  5 mg  06/25/19 20:42  06/27/19 02:19





  Roxicodone  PO   5 mg





  Q4H PRN   Administration





  Pain, Moderate (4-6)  


 


Sodium Chloride  10 ml  06/25/19 22:00  06/28/19 21:53





  Sodium Chloride Flush Syringe 10 Ml  IV   10 ml





  BID ALVERTO   Administration


 


Sodium Chloride  10 ml  06/25/19 17:35 





  Sodium Chloride Flush Syringe 10 Ml  IV  





  PRN PRN  





  LINE FLUSH

## 2019-06-29 NOTE — PROGRESS NOTE
Assessment and Plan


Acute exacerbation of chronic obstructive pulmonary disease (COPD)


CHF (congestive heart failure)


Obesity hypoventilation syndrome


Diaphragm injury


hx of RIB fracture


DVT prophylaxis





- continue bronchodilators with pulmonary hygiene per RT


- steroids with slow taper


- complete empiric AB's


- supplemental oxygen as needed to keep O2 sats > 90%


- prn analgesia; rib fractures appear old and healing


- VTE prophylaxis


-LABA/LAMA on discharge


-Early outpatient pulmonary follow up for PFTs ( evaluate pulmonary physiology) 

and evaluation for sleep apnea on discharge


-Discharge planning, discussed with Dr. Bhakta





Subjective


Date of service: 06/29/19


Principal diagnosis: Ac. exacerbation of COPD; CHF; OHS; Diaphragm injury; hx of

RIB fracture


Interval history: 





Patient is seen today for: Acute exacerbation of chronic obstructive pulmonary 

disease (COPD); CHF (congestive heart failure); Obesity hypoventilation 

syndrome; Diaphragm injury; hx of RIB fracture





Seen and examined at bedside; 24hour events reviewed; nursing and respiratory 

care staff consulted; no adverse overnight events reported to me; resting 

peacefully in bed; less SOB; discharge planning on going, no fevers, no chills, 

no chest pain





Objective


                               Vital Signs - 12hr











  06/28/19 06/28/19 06/28/19





  20:19 21:26 21:38


 


Temperature   


 


Pulse Rate 83  


 


Pulse Rate [  92 H 90





Anterior   





Bilateral   





Throughout]   


 


Respiratory   





Rate   


 


Respiratory  12 13





Rate [Anterior   





Bilateral   





Throughout]   


 


Blood Pressure   


 


O2 Sat by Pulse   





Oximetry   














  06/28/19 06/29/19 06/29/19





  23:42 04:12 06:26


 


Temperature 98.0 F  


 


Pulse Rate 81  


 


Pulse Rate [   65





Anterior   





Bilateral   





Throughout]   


 


Respiratory 18 18 





Rate   


 


Respiratory   14





Rate [Anterior   





Bilateral   





Throughout]   


 


Blood Pressure 104/69  


 


O2 Sat by Pulse 92  





Oximetry   














  06/29/19





  06:38


 


Temperature 


 


Pulse Rate 


 


Pulse Rate [ 68





Anterior 





Bilateral 





Throughout] 


 


Respiratory 





Rate 


 


Respiratory 15





Rate [Anterior 





Bilateral 





Throughout] 


 


Blood Pressure 


 


O2 Sat by Pulse 





Oximetry 











Constitutional: no acute distress, other (middle aged obese AAM with normal  

resp effort at rest)


Eyes: non-icteric


ENT: oropharynx moist, other (mallampati 3)


Neck: supple, no lymphadenopathy, no JVD, other (large neck circumference)


Effort: mildly labored


Ascultation: Bilateral: diminished breath sounds, rhonchi (scant in bases)


Percussion: Bilateral: not dull


Cardiovascular: regular rate and rhythm


Gastrointestinal: normoactive bowel sounds, soft, non-tender, non-distended


Integumentary: normal


Extremities: no cyanosis, no edema, pulses normal, no ischemia or petechiae


Neurologic: normal mental status, non-focal exam, pupils equal and round, motor 

strength normal and


Psychiatric: anxious


CBC and BMP: 


                                 06/26/19 05:06





                                 06/26/19 05:06


ABG, PT/INR, D-dimer: 


ABG











POC ABG pH  7.418  (7.35-7.45)   06/28/19  09:37    


 


POC ABG pCO2  40.2  (35-45)   06/28/19  09:37    


 


POC ABG pO2  79  ()  L  06/28/19  09:37    


 


POC ABG HCO3  26.0  (22-26 mml/L)   06/28/19  09:37    


 


POC ABG Total CO2  27  (23-27mmol/L)   06/28/19  09:37    


 


POC ABG O2 Sat  96   06/28/19  09:37    








Abnormal lab findings: 


                                  Abnormal Labs











  06/25/19 06/25/19 06/25/19





  01:30 13:04 13:04


 


WBC   


 


RBC   5.16 H 


 


MCV   


 


MCH   


 


RDW   17.0 H 


 


Monocytes % (Manual)   12.0 H 


 


Lymphocytes # (Manual)   1.0 L 


 


POC ABG pO2   


 


POC Glucose   


 


NT-Pro-B Natriuret Pep   


 


Total Protein   


 


Albumin    3.8 L


 


Alpha-1-Globulins   


 


Alpha-2-Globulins   


 


Beta-2-Microglobulin   


 


PEP Interpretation   


 


Free T4   


 


Ur Specific Gravity  > 1.030 H  














  06/25/19 06/25/19 06/25/19





  19:43 19:43 19:43


 


WBC   


 


RBC   


 


MCV   


 


MCH   


 


RDW   


 


Monocytes % (Manual)   


 


Lymphocytes # (Manual)   


 


POC ABG pO2   


 


POC Glucose   


 


NT-Pro-B Natriuret Pep  913.6 H  


 


Total Protein   


 


Albumin   


 


Alpha-1-Globulins   


 


Alpha-2-Globulins   


 


Beta-2-Microglobulin    4.41 H


 


PEP Interpretation   


 


Free T4   1.63 H 


 


Ur Specific Gravity   














  06/25/19 06/26/19 06/26/19





  19:43 05:06 05:06


 


WBC   3.9 L 


 


RBC   


 


MCV   83 L 


 


MCH   27 L 


 


RDW   16.3 H 


 


Monocytes % (Manual)   20.0 H 


 


Lymphocytes # (Manual)   0.9 L 


 


POC ABG pO2   


 


POC Glucose   


 


NT-Pro-B Natriuret Pep   


 


Total Protein    6.0 L


 


Albumin  3.4 L   3.4 L


 


Alpha-1-Globulins  0.4 H  


 


Alpha-2-Globulins  1.0 H  


 


Beta-2-Microglobulin   


 


PEP Interpretation  see below H  


 


Free T4   


 


Ur Specific Gravity   














  06/27/19 06/28/19





  20:52 09:37


 


WBC  


 


RBC  


 


MCV  


 


MCH  


 


RDW  


 


Monocytes % (Manual)  


 


Lymphocytes # (Manual)  


 


POC ABG pO2   79 L


 


POC Glucose  135 H 


 


NT-Pro-B Natriuret Pep  


 


Total Protein  


 


Albumin  


 


Alpha-1-Globulins  


 


Alpha-2-Globulins  


 


Beta-2-Microglobulin  


 


PEP Interpretation  


 


Free T4  


 


Ur Specific [place]  











Chest x-ray: image reviewed (Chronic pulmonary changes, cardiac device)


Allied health notes reviewed: nursing

## 2019-06-30 NOTE — CONSULTATION
PHYSICIAN:  Dr. Bhakta.



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



HISTORY OF PRESENT ILLNESS:  I saw the patient, a 54-year-old male with history

of COPD on steroids intermittently for more than 10 years, history of congestive

heart failure, obesity, hypoventilation syndrome.  The patient has moved from

[place].  He came to the hospital because of left flank pain.  He informed that

in the past he was told that he has rib fractures.  I have been consulted

because of avascular necrosis and rib fractures.  History of shortness of breath

with a history of cough present and history of leg swelling present.  No fever,

no chills, no chest pain but has left flank pain, no palpitations, no nausea, no

vomiting, no diarrhea, no bleeding per rectum.



PAST MEDICAL HISTORY:  As above.



PAST SURGICAL HISTORY:  Embolectomy.



SOCIAL HISTORY:  No history of tobacco or alcohol usage.



FAMILY HISTORY:  Diabetes and hypertension.



ALLERGIES:  DOXYCYCLINE, LISINOPRIL, SPIRONOLACTONE.



PRESENT MEDICATIONS:  Include Tylenol, metoprolol, Zofran, Solu-Medrol.



PHYSICAL EXAMINATION:

VITAL SIGNS:  Temperature 98, pulse 81, respirations 18, /80.

HEENT:  No pallor, no icterus.

NECK:  No neck lymph nodes.

HEART:  S1, S2.

LUNGS:  Clear to auscultation.

ABDOMEN:  Soft.

EXTREMITIES:  No calf tenderness.

NEUROLOGIC:  Alert, awake, answers questions appropriately.



LABORATORY DATA:  White cell 3.9, hemoglobin 12, MCV 83, platelets 183,

potassium 4.4, creatinine 1.3, calcium 8.7.  No M-spike detected.  TSH 0.95.  No

monoclonal proteins detected.  



RADIOLOGY:  Abdomen and pelvis CT shows large amount of fat density in the left

lung field extending superiorly and posteriorly left lung base.  Old left rib

fractures.  There is a large amount of adipose tissue emanating from the abdomen

to the left pleural space and also mention of hip avascular necrosis.  They also

mentioned liposarcoma cannot be ruled out entirely.



ASSESSMENT:

1.  Rib fractures.  This may be steroid related.

2.  Mention of avascular right hip changes.  This may be secondary to steroids.

3.  Fat abnormality in the left pleural/chest area likely liposarcoma cannot be

excluded.  This is followed up as an outpatient.

4.  History of chronic obstructive pulmonary disease.

5.  History of congestive heart failure.

6.  History of sleep apnea.  I will follow the patient during inpatient stay and

then in the clinic setting.





DD: 06/29/2019 17:30

DT: 06/30/2019 01:11

JOB# [id number]  [id number]

NM/SUHAIL

## 2019-07-09 LAB
ALBUMIN SERPL-MCNC: (no result) G/DL
CREAT UR-MCNC: (no result) MG/DL
GAMMA GLOB SERPL ELPH-MCNC: (no result) G/DL
IMP & REVIEW OF LAB RESULTS: (no result)
PROT/CREAT UR: (no result) MG/G{CREAT}